# Patient Record
Sex: FEMALE | URBAN - METROPOLITAN AREA
[De-identification: names, ages, dates, MRNs, and addresses within clinical notes are randomized per-mention and may not be internally consistent; named-entity substitution may affect disease eponyms.]

---

## 2021-01-13 ENCOUNTER — OFFICE VISIT (OUTPATIENT)
Dept: ENDOCRINOLOGY | Age: 31
End: 2021-01-13
Payer: COMMERCIAL

## 2021-01-13 VITALS — HEIGHT: 65 IN | TEMPERATURE: 97.6 F | WEIGHT: 133.6 LBS | RESPIRATION RATE: 14 BRPM | BODY MASS INDEX: 22.26 KG/M2

## 2021-01-13 DIAGNOSIS — J45.20 MILD INTERMITTENT ASTHMA WITHOUT COMPLICATION: ICD-10-CM

## 2021-01-13 DIAGNOSIS — Z79.4 LONG-TERM INSULIN USE (HCC): ICD-10-CM

## 2021-01-13 PROCEDURE — 99243 OFF/OP CNSLTJ NEW/EST LOW 30: CPT | Performed by: INTERNAL MEDICINE

## 2021-01-13 RX ORDER — FEXOFENADINE HYDROCHLORIDE 60 MG/1
60 TABLET, FILM COATED ORAL DAILY
COMMUNITY

## 2021-01-13 RX ORDER — BLOOD-GLUCOSE METER
KIT MISCELLANEOUS
COMMUNITY
Start: 2020-11-19

## 2021-01-13 RX ORDER — GLUCAGON 3 MG/1
POWDER NASAL
Qty: 2 EACH | Refills: 3 | Status: SHIPPED | OUTPATIENT
Start: 2021-01-13 | End: 2021-05-05

## 2021-01-13 RX ORDER — DULAGLUTIDE 1.5 MG/.5ML
1.5 INJECTION, SOLUTION SUBCUTANEOUS WEEKLY
COMMUNITY
Start: 2020-07-21 | End: 2021-05-05

## 2021-01-13 RX ORDER — ALBUTEROL SULFATE 2.5 MG/3ML
2.5 SOLUTION RESPIRATORY (INHALATION) EVERY 6 HOURS PRN
COMMUNITY
Start: 2020-10-04

## 2021-01-13 RX ORDER — ALBUTEROL SULFATE 90 UG/1
1 AEROSOL, METERED RESPIRATORY (INHALATION) EVERY 4 HOURS PRN
COMMUNITY
Start: 2020-12-14

## 2021-01-13 RX ORDER — INSULIN GLARGINE 100 [IU]/ML
18 INJECTION, SOLUTION SUBCUTANEOUS NIGHTLY
COMMUNITY
End: 2021-05-11 | Stop reason: SDUPTHER

## 2021-01-13 RX ORDER — VALACYCLOVIR HYDROCHLORIDE 500 MG/1
500 TABLET, FILM COATED ORAL DAILY
COMMUNITY
Start: 2020-10-07

## 2021-01-13 NOTE — PROGRESS NOTES
Noah Trujillo is a 31 y.o. female is referred to me by Dr John Mejia for evaluation and management of uncontrolled type 1 diabetes. Noah Trujillo was diagnosed with Diabetes mellitus in 2015 . Diabetes was diagnosed at OGTT when she was pregnant with her son  . Noah Trujillo got diabetic education in the past.  Pt is noted to C pep < 1.0 in 2017. She has been on insulin for years  Comorbid conditions: Neuropathy  She stopped insulin after delivery but within 5 days she was admitted with DKA and sincethen she has been on insulin   She has Asthma and she is on inhalers     INTERIM:    Diabetes  She presents for her initial diabetic visit. She has type 1 diabetes mellitus. MedicAlert identification noted. The initial diagnosis of diabetes was made 5 years ago. Her disease course has been worsening. Hypoglycemia symptoms include sweats and tremors. Associated symptoms include foot paresthesias. There are no hypoglycemic complications. Symptoms are worsening. There are no diabetic complications. Current diabetic treatment includes insulin injections and oral agent (dual therapy). She is compliant with treatment most of the time. Her weight is stable. She is following a generally healthy and diabetic diet. Meal planning includes avoidance of concentrated sweets. She has had a previous visit with a dietitian. She participates in exercise daily. Her breakfast blood glucose is taken between 7-8 am. Her breakfast blood glucose range is generally 70-90 mg/dl. Her lunch blood glucose is taken between 11-12 pm. Her lunch blood glucose range is generally 140-180 mg/dl. Her dinner blood glucose is taken between 6-7 pm. Her dinner blood glucose range is generally 140-180 mg/dl. An ACE inhibitor/angiotensin II receptor blocker is not being taken. She sees a podiatrist.Eye exam is current.          She is on trulicity , lantus 18 units daily Metformin 500 TID   Having bruises due to trulicity and would like to stop it.  Occasionally has hypoglycemia with glucose as low as 50. Past Medical History:   Diagnosis Date    Asthma     [facility]     Eczema       Patient Active Problem List   Diagnosis    Type 1 diabetes, uncontrolled, with neuropathy ([facility]    Mild intermittent asthma without complication    Long-term insulin use ([facility]     History reviewed. No pertinent surgical history.   Social History     Socioeconomic History    Marital status: Unknown     Spouse name: Not on file    Number of children: Not on file    Years of education: Not on file    Highest education level: Not on file   Occupational History    Not on file   Social Needs    Financial resource strain: Not on file    Food insecurity     Worry: Not on file     Inability: Not on file    Transportation needs     Medical: Not on file     Non-medical: Not on file   Tobacco Use    Smoking status: Never Smoker    Smokeless tobacco: Never Used   Substance and Sexual Activity    Alcohol use: Yes     Comment: weekends    Drug use: Never    Sexual activity: Not on file   Lifestyle    Physical activity     Days per week: Not on file     Minutes per session: Not on file    Stress: Not on file   Relationships    Social connections     Talks on phone: Not on file     Gets together: Not on file     Attends Qu[employer] service: Not on file     Active member of club or organization: Not on file     Attends meetings of clubs or organizations: Not on file     Relationship status: Not on file    Intimate partner violence     Fear of current or ex partner: Not on file     Emotionally abused: Not on file     Physically abused: Not on file     Forced sexual activity: Not on file   Other Topics Concern    Not on file   Social History Narrative    Not on file     Family History   Problem Relation Age of Onset    Diabetes type 2  Maternal Uncle      Current Outpatient Medications   Medication Sig Dispense Refill    albuterol sulfate  (90 Base) MCG/ACT inhaler Inhale 1 puff into the lungs every 4 hours as needed      albuterol (PROVENTIL) (2.5 MG/3ML) 0.083% nebulizer solution Inhale 2.5 mg into the lungs every 6 hours as needed      Dulaglutide (TRULICITY) 1.5 UY/1.8UH SOPN Inject 1.5 mg into the skin once a week      fexofenadine (ALLEGRA) 60 MG tablet Take 60 mg by mouth daily      FREESTYLE LITE strip       insulin glargine (LANTUS) 100 UNIT/ML injection vial Inject 18 Units into the skin nightly      metFORMIN (GLUCOPHAGE) 500 MG tablet Take 1,000 mg by mouth 2 times daily (with meals)      valACYclovir (VALTREX) 500 MG tablet Take 500 mg by mouth daily      Glucagon (BAQSIMI ONE PACK) 3 MG/DOSE POWD To be used in case of severe hypoglycemia 2 each 3     No current facility-administered medications for this visit. Allergies   Allergen Reactions    Food Swelling     WALNUTS: tongue swells up     Family Status   Relation Name Status    [facility] [place] [facility]  (Not Specified)       Review of Systems  I have reviewed the review of system questionnaire filled by the patient .   Patient was advised to contact PCP for non endocrine signs and symptoms       OBJECTIVE:  Temp 97.6 °F (36.4 °C)   Resp 14   Ht 5' 5\" (1.651 m)   Wt 133 lb 9.6 oz (60.6 kg)   BMI 22.23 kg/m²    Wt Readings from Last 3 Encounters:   01/13/21 133 lb 9.6 oz (60.6 kg)       Constitutional: no acute distress, well appearing and well nourished  Psychiatric: oriented to person, place and time, judgement and insight and normal, recent and remote memory and intact and mood and affect are normal  Skin: skin and subcutaneous tissue is normal without mass, normal turgor  Head and Face: examination of head and face revealed no abnormalities  Eyes: no lid or conjunctival swelling, erythema or discharge, pupils are normal  Ears/Nose: external inspection of ears and nose revealed no abnormalities, hearing is grossly normal  Oropharynx/Mouth/Face: lips, tongue and gums are normal with no lesions, the voice quality was normal  Neck: neck is supple and symmetric, with midline trachea and no masses, thyroid is normal  Lymphatics: normal cervical lymph nodes, normal supraclavicular nodes  Pulmonary: no increased work of breathing or signs of respiratory distress, lungs are clear to auscultation  Cardiovascular: normal heart rate and rhythm, normal S1 and S2, no murmurs   Abdomen: abdomen is soft, non-tender with no masses  Musculoskeletal: normal gait and station . Neurological: normal coordination and normal general cortical function      No results found for: LABA1C      ASSESSMENT/PLAN:  1. Type 1 diabetes, uncontrolled, with neuropathy A1c 7.9 in January 2021     I had a lengthy discussion with the patient about  her  uncontrolled diabetes. We discussed progression of diabetes in detail and also the incidence of complications associated with uncontrolled diabetes. Tasia Kirkpatrickmings was advised that lifestyle modification is the key to better control of her Diabetes. We discussed carbohydrate restriction in detail. Patient is currently using 18 units of Lantus she was advised to reduce the dose to 16  Due to injection site issues she would like to stop Trulicity  She will continue with Metformin  Start CGMS and will add on meal boluses if there is wide fluctuations after meals  Patient is interested in insulin pump today we had a lengthy discussion about [employer] closed-loop system versus control IQ patient was given literature on both of them she would review it and will contact whichever one is covered with her insurance. Hypoglycemia protocol reviewed in detail with patient Patient was advised to carry glucose tablets and also have glucagon emergency kit. Patient checks her fingerstick blood glucose 4-6 times a day. Patient was advised that sending of her fingerstick blood glucose logs is crucial in management of her  diabetes.  I will adjust the  doses of diabetic medications according to sent data. Health Maintenance       Last Eye Exam: advised to have annual dilated eye exam. her last eye exam was in nov 2020   Last Podiatry Exam:  Last foot exam was in July 2020 with Dr Katie Sunshine   Lipids: Last LDL level was 57 in January 2021  Microalbumin/Creatinine Ratio: I have ordered MA with next lab work     . Education: Reviewed ABCs of diabetes management (respective goals in parentheses): A1C (<7), blood pressure (<130/80), and cholesterol (LDL <100). - C-Peptide; Future  - Comprehensive Metabolic Panel; Future  - TSH without Reflex; Future  - Anti-Thyroglobulin Antibody; Future  - Thyroid Peroxidase Antibody; Future  - T4, Free; Future  - Glutamic Acid Decarboxylase; Future  - Microalbumin / Creatinine Urine Ratio; Future  - Comprehensive Metabolic Panel; Future  - Hemoglobin A1C; Future  - Lipid Panel; Future  - Microalbumin / Creatinine Urine Ratio; Future  - TSH without Reflex; Future  - Vitamin D 25 Hydroxy; Future    --- Mild intermittent asthma without complication  She is on inhalers     Seasonal allergies  Stable      Reviewed and/or ordered clinical lab results yes   Reviewed and/or ordered radiology tests Yes  Reviewed and/or ordered other diagnostic tests yes   Made a decision to obtain old records yes   Reviewed and summarized old records yes     Cherie Estrada was counseled regarding symptoms of current diagnosis, course and complications of disease if inadequately treated, side effects of medications, diagnosis, treatment options, and prognosis, risks, benefits, complications, and alternatives of treatment, labs, imaging and other studies and treatment targets and goals. She understands instructions and counseling      Return in about 3 months (around 4/13/2021). Please note that some or all of this report was generated using voice recognition software.  Please notify me in case of any questions about the content of this document, as some errors in transcription may have occurred .

## 2021-01-19 ENCOUNTER — TELEPHONE (OUTPATIENT)
Dept: ENDOCRINOLOGY | Age: 31
End: 2021-01-19

## 2021-01-20 ENCOUNTER — TELEPHONE (OUTPATIENT)
Dept: ENDOCRINOLOGY | Age: 31
End: 2021-01-20

## 2021-01-20 ENCOUNTER — NURSE ONLY (OUTPATIENT)
Dept: ENDOCRINOLOGY | Age: 31
End: 2021-01-20
Payer: COMMERCIAL

## 2021-01-20 VITALS — TEMPERATURE: 97.3 F

## 2021-01-20 DIAGNOSIS — E06.3 HASHIMOTO'S DISEASE: ICD-10-CM

## 2021-01-20 PROCEDURE — 95251 CONT GLUC MNTR ANALYSIS I&R: CPT | Performed by: INTERNAL MEDICINE

## 2021-01-20 NOTE — PROGRESS NOTES
Patient presents for a Itaro removal. Sensor scanned and downloaded with no issues. Patient tolerated well and data sent to Dr. Ailyn Ornelas for review. Sensor removed. Skin remains intact. No complaints of pain or discomfort.

## 2021-01-20 NOTE — TELEPHONE ENCOUNTER
Diabetes Continuous Glucose Monitoring Report         Reason for Study:     - improve diabetic control without risk of hypoglycemia       Current Medication regimen:        CGMS Report   CGMS insertion date January 13, 2021  CGMS data collection was performed on January 20th 2021  Patient provided information on her  diet, activities and insulin dosing  during this period. Data was available for 6  days     Sensor Data Report:   - 12 AM to 6 AM: Overnight blood glucose pattern shows hypoglycemia  - 6   AM to 10 AM:  Post breakfast hyperglycemia is noted  --10AM to 5 PM : 1 episode of hypoglycemia observed during this time.   - 5   PM to 8 PM: Post meal hyperglycemia is noted       Average reading 119 mg/dL        40.5 coefficient of variation   % of time <70 mg/dL 17 %   % of time >180  mg/dL 10%   % of time within range 73%  Number of hypoglycemia episodes noted: 0     Impression:   CGMS shows overnight hypoglycemia with postprandial hypoglycemia     Recommendation:      Patient was advised to make the following changes in her diabetic regimen  Please advise patient to reduce long-acting insulin from 18 units to 16 units  I would recommend starting short acting insulin with meals that have high amounts of carbohydrate according to carbohydrate to insulin ratio of 10:1.

## 2021-01-21 ENCOUNTER — TELEPHONE (OUTPATIENT)
Dept: ENDOCRINOLOGY | Age: 31
End: 2021-01-21

## 2021-01-21 RX ORDER — INSULIN ASPART 100 [IU]/ML
INJECTION, SOLUTION INTRAVENOUS; SUBCUTANEOUS
Qty: 6 PEN | Refills: 3 | Status: SHIPPED | OUTPATIENT
Start: 2021-01-21 | End: 2021-05-11 | Stop reason: SDUPTHER

## 2021-01-21 RX ORDER — FLASH GLUCOSE SENSOR
KIT MISCELLANEOUS
Qty: 2 EACH | Refills: 5 | Status: SHIPPED | OUTPATIENT
Start: 2021-01-21 | End: 2021-05-05

## 2021-01-21 RX ORDER — FLASH GLUCOSE SCANNING READER
EACH MISCELLANEOUS
Qty: 1 DEVICE | Refills: 0 | Status: SHIPPED | OUTPATIENT
Start: 2021-01-21 | End: 2021-05-05

## 2021-01-21 NOTE — TELEPHONE ENCOUNTER
Fax from [facility] for a Prior authorization needed for the [facility] 14 day sensor qty 2 and also the [facility] 14 day reader qty 1    LOV:  1/20/21     FOV:  5/5/21

## 2021-01-21 NOTE — TELEPHONE ENCOUNTER
Patient aware. Novolog Rx  sent to pharmacy. Patient had labs done at [facility]. Geno Garcia. They are in [facility].

## 2021-01-21 NOTE — TELEPHONE ENCOUNTER
Please advise patient that her thyroid function test shows that she has positive thyroid antibodies showing that she does have Hashimoto's but her thyroid function test are still within normal limits so she will not be started on medication yet we will repeat the thyroid function test at follow-up

## 2021-01-22 ENCOUNTER — TELEPHONE (OUTPATIENT)
Dept: ENDOCRINOLOGY | Age: 31
End: 2021-01-22

## 2021-01-22 RX ORDER — BLOOD-GLUCOSE SENSOR
EACH MISCELLANEOUS
Qty: 3 EACH | Refills: 5 | Status: SHIPPED | OUTPATIENT
Start: 2021-01-22 | End: 2021-12-09 | Stop reason: SDUPTHER

## 2021-01-22 RX ORDER — BLOOD-GLUCOSE,RECEIVER,CONT
EACH MISCELLANEOUS
Qty: 1 DEVICE | Refills: 0 | Status: SHIPPED | OUTPATIENT
Start: 2021-01-22 | End: 2021-12-09 | Stop reason: SDUPTHER

## 2021-01-22 RX ORDER — BLOOD-GLUCOSE TRANSMITTER
EACH MISCELLANEOUS
Qty: 1 EACH | Refills: 3 | Status: SHIPPED | OUTPATIENT
Start: 2021-01-22 | End: 2021-12-09 | Stop reason: SDUPTHER

## 2021-01-22 NOTE — TELEPHONE ENCOUNTER
Submitted PA today 1/22/2021 and insurance states:    Message from [facility]: Drug is not covered by plan

## 2021-02-08 ENCOUNTER — TELEPHONE (OUTPATIENT)
Dept: ENDOCRINOLOGY | Age: 31
End: 2021-02-08

## 2021-02-11 ENCOUNTER — TELEPHONE (OUTPATIENT)
Dept: ENDOCRINOLOGY | Age: 31
End: 2021-02-11

## 2021-02-11 NOTE — TELEPHONE ENCOUNTER
Fax from [facility] supplies for a standard written order for Rcvr/trans dexcom G6 qty 1/4    LOV:  1/13/21    FOV:  5/5/21

## 2021-02-26 ENCOUNTER — TELEPHONE (OUTPATIENT)
Dept: ENDOCRINOLOGY | Age: 31
End: 2021-02-26

## 2021-02-26 NOTE — TELEPHONE ENCOUNTER
Fax from [facility] stating that they need to have a letter of medical necessity form sent to them

## 2021-03-08 ENCOUNTER — PATIENT MESSAGE (OUTPATIENT)
Dept: ENDOCRINOLOGY | Age: 31
End: 2021-03-08

## 2021-03-08 NOTE — TELEPHONE ENCOUNTER
From: David Guadalupe  To: Sarabjit Wong MD  Sent: 3/8/2021 11:28 AM EST  Subject: Prescription Question    Good morning  I was wondering if there was a way to get a new prescription for my pen needles I am running out and will not be able to make it until my appointment. I'm a new patient so this will be a new prescription from dr Jim Patiño.  Can you please [facility]'s [facility] because it's a [address] [facility]   Thanks

## 2021-05-05 ENCOUNTER — OFFICE VISIT (OUTPATIENT)
Dept: ENDOCRINOLOGY | Age: 31
End: 2021-05-05
Payer: COMMERCIAL

## 2021-05-05 VITALS
WEIGHT: 148 LBS | HEIGHT: 65 IN | RESPIRATION RATE: 16 BRPM | BODY MASS INDEX: 24.66 KG/M2 | SYSTOLIC BLOOD PRESSURE: 151 MMHG | DIASTOLIC BLOOD PRESSURE: 94 MMHG | HEART RATE: 80 BPM

## 2021-05-05 DIAGNOSIS — E03.8 HYPOTHYROIDISM DUE TO HASHIMOTO'S THYROIDITIS: ICD-10-CM

## 2021-05-05 DIAGNOSIS — E06.3 HYPOTHYROIDISM DUE TO HASHIMOTO'S THYROIDITIS: ICD-10-CM

## 2021-05-05 DIAGNOSIS — Z79.4 LONG-TERM INSULIN USE (HCC): ICD-10-CM

## 2021-05-05 DIAGNOSIS — J45.20 MILD INTERMITTENT ASTHMA WITHOUT COMPLICATION: ICD-10-CM

## 2021-05-05 PROCEDURE — 83036 HEMOGLOBIN GLYCOSYLATED A1C: CPT | Performed by: INTERNAL MEDICINE

## 2021-05-05 PROCEDURE — 95251 CONT GLUC MNTR ANALYSIS I&R: CPT | Performed by: INTERNAL MEDICINE

## 2021-05-05 PROCEDURE — 99214 OFFICE O/P EST MOD 30 MIN: CPT | Performed by: INTERNAL MEDICINE

## 2021-05-05 NOTE — PATIENT INSTRUCTIONS
GOALS - 1. HBA1C (3MONTH AVG) SHOULD BE LESS THAN 6.5     PLEASE CHECK YOUR SUGARS:   BEFORE BREAKFAST  BEFORE LUNCH  BEFORE DINNER  BEDTIME  AFTER MEALS    2. FINGERSTICKS SHOULD BE   BEFORE MEALS <   AFTER MEALS < 140   BEDTIME >100     3. CARBOHYDRATES  MEALS 40--60 G  SNACKS 15 - 20 G    4.  BLOOD PRESSURE  < 130/80    --- reduce lantus 16  units nightly   ---Start taking short acting insulin (humalog/novolog) according to carbohydrate to insulin ratio 5 gm of carb = 1 unit of short acting insulin,                                        +    Sliding Scale Insulin for short acting insulin   If BS > 120 -150 take 2 additional units of fast actinginsulin   if --180 take 3 units   181-210 take 4 Units  211-240 take 5Units   241--270 take 6 Units   271- 300 take  7 Units

## 2021-05-05 NOTE — PROGRESS NOTES
Sruthi Pastor is a 31 y.o. female is referred to me by Dr Georgette Albarran for evaluation and management of uncontrolled type 1 diabetes. Sruthi Pastor was diagnosed with Diabetes mellitus in 2015 . Diabetes was diagnosed at OGTT when she was pregnant with her son  . Sruthi Pastor got diabetic education in the past.  Pt is noted to C pep < 1.0 in 2017. She has been on insulin for years  Comorbid conditions: Neuropathy  She stopped insulin after delivery but within 5 days she was admitted with DKA and sincethen she has been on insulin   She has Asthma and she is on inhalers     INTERIM:    Diabetes  She presents for her follow-up diabetic visit. She has type 1 diabetes mellitus. MedicAlert identification noted. The initial diagnosis of diabetes was made 5 years ago. Her disease course has been worsening. Hypoglycemia symptoms include sweats and tremors. Associated symptoms include foot paresthesias. There are no hypoglycemic complications. Symptoms are worsening. There are no diabetic complications. Current diabetic treatment includes insulin injections and oral agent (dual therapy). She is compliant with treatment most of the time. Her weight is stable. She is following a generally healthy and diabetic diet. Meal planning includes avoidance of concentrated sweets. She has had a previous visit with a dietitian. She participates in exercise daily. Her breakfast blood glucose is taken between 7-8 am. Her breakfast blood glucose range is generally 70-90 mg/dl. Her lunch blood glucose is taken between 11-12 pm. Her lunch blood glucose range is generally 140-180 mg/dl. Her dinner blood glucose is taken between 6-7 pm. Her dinner blood glucose range is generally 140-180 mg/dl. An ACE inhibitor/angiotensin II receptor blocker is not being taken. She sees a podiatrist.Eye exam is current.         lantus 18 units daily Metformin 500 TID   She was Having bruises due to trulicity so stopped it in jan 2021 feels like she might have gained 5 pounds since stopping Trulicity  Denies any unexplained hypoglycemia but has occasional morning hypoglycemia and is currently eating snacks at bedtime to avoid that      Past Medical History:   Diagnosis Date    Asthma     Diabetes mellitus ([facility]     Eczema       Patient Active Problem List   Diagnosis    Type 1 diabetes, uncontrolled, with neuropathy ([facility]    Mild intermittent asthma without complication    Long-term insulin use ([facility]     History reviewed. No pertinent surgical history.   Social History     Socioeconomic History    Marital status: Unknown     Spouse name: Not on file    Number of children: Not on file    Years of education: Not on file    Highest education level: Not on file   Occupational History    Not on file   Social Needs    Financial resource strain: Not on file    Food insecurity     Worry: Not on file     Inability: Not on file    Transportation needs     Medical: Not on file     Non-medical: Not on file   Tobacco Use    Smoking status: Never Smoker    Smokeless tobacco: Never Used   Substance and Sexual Activity    Alcohol use: Yes     Comment: weekends    Drug use: Never    Sexual activity: Not on file   Lifestyle    Physical activity     Days per week: Not on file     Minutes per session: Not on file    Stress: Not on file   Relationships    Social connections     Talks on phone: Not on file     Gets together: Not on file     Attends Restorationist service: Not on file     Active member of club or organization: Not on file     Attends meetings of clubs or organizations: Not on file     Relationship status: Not on file    Intimate partner violence     Fear of current or ex partner: Not on file     Emotionally abused: Not on file     Physically abused: Not on file     Forced sexual activity: Not on file   Other Topics Concern    Not on file   Social History Narrative    Not on file     Family History   Problem Relation Age of Onset    Diabetes type 2 Maternal Uncle      Current Outpatient Medications   Medication Sig Dispense Refill    Insulin Pen Needle 31G X 8 MM MISC Inject insulin 4 times daily. 600 each 5    Continuous Blood Gluc  (DEXCOM G6 ) PRICILA Check glucose 1 Device 0    Continuous Blood Gluc Sensor (DEXCOM G6 SENSOR) MISC Change every 10 days 3 each 5    Continuous Blood Gluc Transmit (DEXCOM G6 TRANSMITTER) MISC To check glucose change one every 3 months 1 each 3    insulin aspart (NOVOLOG FLEXPEN) 100 UNIT/ML injection pen Inject 20 units into the skin with meals three times daily. 6 pen 3    albuterol sulfate  (90 Base) MCG/ACT inhaler Inhale 1 puff into the lungs every 4 hours as needed      albuterol (PROVENTIL) (2.5 MG/3ML) 0.083% nebulizer solution Inhale 2.5 mg into the lungs every 6 hours as needed      fexofenadine (ALLEGRA) 60 MG tablet Take 60 mg by mouth daily      FREESTYLE LITE strip       insulin glargine (LANTUS) 100 UNIT/ML injection vial Inject 18 Units into the skin nightly      metFORMIN (GLUCOPHAGE) 500 MG tablet Take 1,000 mg by mouth 2 times daily (with meals)      valACYclovir (VALTREX) 500 MG tablet Take 500 mg by mouth daily       No current facility-administered medications for this visit. Allergies   Allergen Reactions    Food Swelling     WALNUTS: tongue swells up     Family Status   Relation Name Status    MUnc  (Not Specified)       Review of Systems  I have reviewed the review of system questionnaire filled by the patient .   Patient was advised to contact PCP for non endocrine signs and symptoms       OBJECTIVE:  BP (!) 151/94   Pulse 80   Resp 16   Ht 5' 5\" (1.651 m)   Wt 148 lb (67.1 kg)   LMP 04/28/2021 (Approximate)   BMI 24.63 kg/m²    Wt Readings from Last 3 Encounters:   05/05/21 148 lb (67.1 kg)   01/13/21 133 lb 9.6 oz (60.6 kg)       Constitutional: no acute distress, well appearing and well nourished  Psychiatric: oriented to person, place and time, judgement and insight and normal, recent and remote memory and intact and mood and affect are normal  Skin: skin and subcutaneous tissue is normal without mass, normal turgor  Head and Face: examination of head and face revealed no abnormalities  Eyes: no lid or conjunctival swelling, erythema or discharge, pupils are normal  Ears/Nose: external inspection of ears and nose revealed no abnormalities, hearing is grossly normal  Oropharynx/Mouth/Face: lips, tongue and gums are normal with no lesions, the voice quality was normal  Neck: neck is supple and symmetric, with midline trachea and no masses, thyroid is normal  Lymphatics: normal cervical lymph nodes, normal supraclavicular nodes  Pulmonary: no increased work of breathing or signs of respiratory distress, lungs are clear to auscultation  Cardiovascular: normal heart rate and rhythm, normal S1 and S2, no murmurs   Musculoskeletal: normal gait and station . Neurological: normal coordination and normal general cortical function      No results found for: LABA1C      ASSESSMENT/PLAN:  1. Type 1 diabetes, uncontrolled, with neuropathy A1c 7.9 in January 2021>>6.4     I had a lengthy discussion with the patient about  her  uncontrolled diabetes. We discussed progression of diabetes in detail and also the incidence of complications associated with uncontrolled diabetes. Tanesha Coats was advised that lifestyle modification is the key to better control of her Diabetes. We discussed carbohydrate restriction in detail.   Patient is currently using 18 units of Lantus she was advised to reduce the dose to 16 units   Stopped trulicity due to injection site   She will continue with Metformin  She is using meal boluses as per 10:1, she will change that carb to insulin ratio to 15-1  She also uses SSI   She was provided with a sliding scale of insulin  Patient is interested in insulin pump today we had a lengthy discussion about Omnisio closed-loop system versus control IQ patient was given literature on both of them she would review it and will contact whichever one is covered with her insurance. Hypoglycemia protocol reviewed in detail with patient Patient was advised to carry glucose tablets and also have glucagon emergency kit. Patient checks her fingerstick blood glucose 4-6 times a day. Patient was advised that sending of her fingerstick blood glucose logs is crucial in management of her  diabetes. I will adjust the  doses of diabetic medications  according to sent data. Health Maintenance       Last Eye Exam: advised to have annual dilated eye exam. her last eye exam was in nov 2020   Last Podiatry Exam:  Last foot exam was in July 2020 with Dr Mara Westbrook   Lipids: Last LDL level was 57 in January 2021  Microalbumin/Creatinine Ratio: normal in jan 2021     . Education: Reviewed ABCs of diabetes management (respective goals in parentheses): A1C (<7), blood pressure (<130/80), and cholesterol (LDL <100).     HAshimotos thyroiditis   TPO positive 54   TSH 1.1  I discussed with her that eventually she probably would need to be on thyroid medication currently since the TSH is normal we will continue to monitor by serial thyroid function test she denies any extreme fatigue constipation or changes in skin or hair texture    --- Mild intermittent asthma without complication  She is on inhalers     Seasonal allergies  Stable      Reviewed and/or ordered clinical lab results yes   Reviewed and/or ordered radiology tests Yes  Reviewed and/or ordered other diagnostic tests yes   Made a decision to obtain old records yes   Reviewed and summarized old records yes     Faraz Crys was counseled regarding symptoms of current diagnosis, course and complications of disease if inadequately treated, side effects of medications, diagnosis, treatment options, and prognosis, risks, benefits, complications, and alternatives of treatment, labs, imaging and other studies and treatment targets and goals. She understands instructions and counseling      Return in about 3 months (around 8/5/2021). Please note that some or all of this report was generated using voice recognition software. Please notify me in case of any questions about the content of this document, as some errors in transcription may have occurred . Diabetes Continuous Glucose Monitoring Report         Reason for Study:     - improve diabetic control without risk of hypoglycemia       Current Medication regimen:   lantus 18 units    novolog as per SSI + CIR 10:1     CGMS Report     CGMS data collection was performed on may 5, 2021   Patient provided information on her  diet, activities and insulin dosing  during this period. Data was available for 14  days     Sensor Data Report:   - 12 AM to 6 AM: Overnight blood glucose pattern shows  - 6   AM to 10 AM:  Post breakfast  is noted  --10AM to 5 PM :  hypoglycemia observed during this time.   - 5   PM to 8 PM: Post meal  is noted       Average reading 147 mg/dL     Standard Dev  43  mg/dL   % of time <70 mg/dL  1%   % of time >180  mg/dL 24%   % of time within range 75 %  Number of hypoglycemia episodes noted: 0     Impression:   CGMS shows overnight hypoglycemia      Recommendation:      Patient was advised to make the following changes in her diabetic regimen  Reduce lantus from 18 units to 16 units   Avoid snack at bedtime

## 2021-05-06 LAB — HBA1C MFR BLD: 6.4 %

## 2021-05-11 ENCOUNTER — PATIENT MESSAGE (OUTPATIENT)
Dept: ENDOCRINOLOGY | Age: 31
End: 2021-05-11

## 2021-05-11 RX ORDER — INSULIN ASPART 100 [IU]/ML
INJECTION, SOLUTION INTRAVENOUS; SUBCUTANEOUS
Qty: 20 PEN | Refills: 1 | Status: SHIPPED | OUTPATIENT
Start: 2021-05-11 | End: 2022-07-12

## 2021-05-11 RX ORDER — INSULIN GLARGINE 100 [IU]/ML
18 INJECTION, SOLUTION SUBCUTANEOUS NIGHTLY
Qty: 2 VIAL | Refills: 1 | Status: SHIPPED | OUTPATIENT
Start: 2021-05-11 | End: 2021-05-12 | Stop reason: SDUPTHER

## 2021-05-11 NOTE — TELEPHONE ENCOUNTER
From: Bernarda Oppenheim  To: Anthony Bell MD  Sent: 5/11/2021 9:23 AM EDT  Subject: Prescription Question    Hi I was just there at my appointment and I forgot to ask for a new prescription for my Lantus if it could be sent to my [facility] pharmacy that is on file that would be awesome. If it could be for a 90 day supply so I can do their mail order pharmacy that would be helpful because it's cheaper.  Also [facility] a refill request for my novolog that is a 90 day supply so it can be mail order as well   Thanks so much

## 2021-05-12 RX ORDER — INSULIN GLARGINE 100 [IU]/ML
18 INJECTION, SOLUTION SUBCUTANEOUS NIGHTLY
Qty: 3 VIAL | Refills: 1 | Status: SHIPPED | OUTPATIENT
Start: 2021-05-12 | End: 2021-08-05 | Stop reason: SDUPTHER

## 2021-08-05 ENCOUNTER — VIRTUAL VISIT (OUTPATIENT)
Dept: ENDOCRINOLOGY | Age: 31
End: 2021-08-05
Payer: COMMERCIAL

## 2021-08-05 DIAGNOSIS — J45.20 MILD INTERMITTENT ASTHMA WITHOUT COMPLICATION: ICD-10-CM

## 2021-08-05 DIAGNOSIS — Z79.4 LONG-TERM INSULIN USE (HCC): ICD-10-CM

## 2021-08-05 PROCEDURE — 3051F HG A1C>EQUAL 7.0%<8.0%: CPT | Performed by: INTERNAL MEDICINE

## 2021-08-05 PROCEDURE — 99214 OFFICE O/P EST MOD 30 MIN: CPT | Performed by: INTERNAL MEDICINE

## 2021-08-05 PROCEDURE — 95251 CONT GLUC MNTR ANALYSIS I&R: CPT | Performed by: INTERNAL MEDICINE

## 2021-08-05 RX ORDER — INSULIN GLARGINE 100 [IU]/ML
18 INJECTION, SOLUTION SUBCUTANEOUS NIGHTLY
Qty: 3 VIAL | Refills: 1 | Status: SHIPPED | OUTPATIENT
Start: 2021-08-05 | End: 2021-09-13

## 2021-08-05 RX ORDER — GLUCAGON 3 MG/1
POWDER NASAL
Qty: 2 EACH | Refills: 3 | Status: SHIPPED | OUTPATIENT
Start: 2021-08-05

## 2021-08-06 ENCOUNTER — TELEPHONE (OUTPATIENT)
Dept: ENDOCRINOLOGY | Age: 31
End: 2021-08-06

## 2021-09-13 ENCOUNTER — PATIENT MESSAGE (OUTPATIENT)
Dept: ENDOCRINOLOGY | Age: 31
End: 2021-09-13

## 2021-09-13 RX ORDER — INSULIN GLARGINE 100 [IU]/ML
INJECTION, SOLUTION SUBCUTANEOUS
Qty: 10 PEN | Refills: 1 | Status: SHIPPED | OUTPATIENT
Start: 2021-09-13 | End: 2022-03-10 | Stop reason: SDUPTHER

## 2021-09-13 RX ORDER — CALCIUM CARB/VITAMIN D3/VIT K1 500-100-40
TABLET,CHEWABLE ORAL
Qty: 100 EACH | Refills: 3 | Status: SHIPPED | OUTPATIENT
Start: 2021-09-13 | End: 2022-08-25

## 2021-09-13 NOTE — TELEPHONE ENCOUNTER
From: Julianne Rivero  To: Estefany Hoyos MD  Sent: 9/13/2021 12:38 PM EDT  Subject: Prescription Question    Hi  sent over a prescription for Lantus vials instead of the flex pens I just need a prescription sent over for syringes for the lantus vials.  If you [facility] could [facility] day supply that would be great

## 2021-12-09 ENCOUNTER — OFFICE VISIT (OUTPATIENT)
Dept: ENDOCRINOLOGY | Age: 31
End: 2021-12-09
Payer: OTHER GOVERNMENT

## 2021-12-09 VITALS
SYSTOLIC BLOOD PRESSURE: 121 MMHG | HEART RATE: 68 BPM | WEIGHT: 157.2 LBS | BODY MASS INDEX: 26.16 KG/M2 | DIASTOLIC BLOOD PRESSURE: 78 MMHG

## 2021-12-09 DIAGNOSIS — J45.20 MILD INTERMITTENT ASTHMA WITHOUT COMPLICATION: Primary | ICD-10-CM

## 2021-12-09 PROCEDURE — 3051F HG A1C>EQUAL 7.0%<8.0%: CPT | Performed by: INTERNAL MEDICINE

## 2021-12-09 PROCEDURE — 99214 OFFICE O/P EST MOD 30 MIN: CPT | Performed by: INTERNAL MEDICINE

## 2021-12-09 PROCEDURE — 95251 CONT GLUC MNTR ANALYSIS I&R: CPT | Performed by: INTERNAL MEDICINE

## 2021-12-09 RX ORDER — BLOOD-GLUCOSE,RECEIVER,CONT
EACH MISCELLANEOUS
Qty: 1 EACH | Refills: 0 | Status: SHIPPED | OUTPATIENT
Start: 2021-12-09

## 2021-12-09 RX ORDER — BLOOD-GLUCOSE TRANSMITTER
EACH MISCELLANEOUS
Qty: 1 EACH | Refills: 3 | Status: SHIPPED | OUTPATIENT
Start: 2021-12-09

## 2021-12-09 RX ORDER — BLOOD-GLUCOSE SENSOR
EACH MISCELLANEOUS
Qty: 3 EACH | Refills: 5 | Status: SHIPPED | OUTPATIENT
Start: 2021-12-09

## 2021-12-09 NOTE — PROGRESS NOTES
August Chase is a 27 y.o. female is referred to me by Dr Rachna Guerrero for evaluation and management of uncontrolled type 1 diabetes. August Chase was diagnosed with Diabetes mellitus in 2015 . Diabetes was diagnosed at OGTT when she was pregnant with her son  . August Chase got diabetic education in the past.  Pt is noted to C pep < 1.0 in 2017. She has been on insulin for years  Comorbid conditions: Neuropathy  She stopped insulin after delivery but within 5 days she was admitted with DKA and sincethen she has been on insulin   She has Asthma and she is on inhalers     INTERIM:    Diabetes  She presents for her follow-up diabetic visit. She has type 1 diabetes mellitus. MedicAlert identification noted. The initial diagnosis of diabetes was made 5 years ago. Her disease course has been worsening. Hypoglycemia symptoms include sweats and tremors. Associated symptoms include foot paresthesias. There are no hypoglycemic complications. Symptoms are worsening. There are no diabetic complications. Current diabetic treatment includes insulin injections and oral agent (dual therapy). She is compliant with treatment most of the time. Her weight is stable. She is following a generally healthy and diabetic diet. Meal planning includes avoidance of concentrated sweets. She has had a previous visit with a dietitian. She participates in exercise daily. Her breakfast blood glucose is taken between 7-8 am. Her breakfast blood glucose range is generally 70-90 mg/dl. Her lunch blood glucose is taken between 11-12 pm. Her lunch blood glucose range is generally 140-180 mg/dl. Her dinner blood glucose is taken between 6-7 pm. Her dinner blood glucose range is generally 140-180 mg/dl. An ACE inhibitor/angiotensin II receptor blocker is not being taken. She sees a podiatrist.Eye exam is current.         lantus 18 units daily    Stopped trulicity in jan 1217 due to pain and bruising   Stop Metformin in July 2021  Denies any unexplained hypoglycemia but has occasional morning hypoglycemia and is currently eating snacks at bedtime to avoid that      Past Medical History:   Diagnosis Date    Asthma     Diabetes mellitus ([facility]     Eczema       Patient Active Problem List   Diagnosis    Type 1 diabetes, uncontrolled, with neuropathy ([facility]    Mild intermittent asthma without complication    Long-term insulin use ([facility]     History reviewed. No pertinent surgical history. Social History     Socioeconomic History    Marital status: Unknown     Spouse name: Not on file    Number of children: Not on file    Years of education: Not on file    Highest education level: Not on file   Occupational History    Not on file   Tobacco Use    Smoking status: Never Smoker    Smokeless tobacco: Never Used   Substance and Sexual Activity    Alcohol use: Yes     Comment: weekends    Drug use: Never    Sexual activity: Not on file   Other Topics Concern    Not on file   Social History Narrative    Not on file     Social Determinants of Health     Financial Resource Strain:     Difficulty of Paying Living Expenses: Not on file   Food Insecurity:     Worried About Running Out of Food in the Last Year: Not on file    Sweta of Food in the Last Year: Not on file   Transportation Needs:     Lack of Transportation (Medical): Not on file    Lack of Transportation (Non-Medical):  Not on file   Physical Activity:     Days of Exercise per Week: Not on file    Minutes of Exercise per Session: Not on file   Stress:     Feeling of Stress : Not on file   Social Connections:     Frequency of Communication with Friends and Family: Not on file    Frequency of Social Gatherings with Friends and Family: Not on file    Attends Denominational Services: Not on file    Active Member of Clubs or Organizations: Not on file    Attends Club or Organization Meetings: Not on file    Marital Status: Not on file   Intimate Partner Violence:     Fear of Current or tongue swells up     Family Status   Relation Name Status    MUnc  (Not Specified)   ROS   I have reviewed the review of system questionnaire filled by the patient . Patient was advised to contact PCP for non endocrine signs and symptoms     OBJECTIVE:  /78   Pulse 68   Wt 157 lb 3.2 oz (71.3 kg)   BMI 26.16 kg/m²    Wt Readings from Last 3 Encounters:   12/09/21 157 lb 3.2 oz (71.3 kg)   05/05/21 148 lb (67.1 kg)   01/13/21 133 lb 9.6 oz (60.6 kg)         Constitutional: no acute distress, well appearing and well nourished  Psychiatric: oriented to person, place and time, judgement and insight and normal, recent and remote memory intact and mood and affect are normal  Skin: skin and subcutaneous tissue is normal without visible mass,   Head and Face: visual inspection  of head and face revealed no abnormalities  Eyes: visual inspection showed no lid or conjunctival swelling, erythema or discharge, pupils are normal, equal, round  Ears/Nose: external inspection of ears and nose revealed no abnormalities, hearing is grossly normal  Oropharynx/Mouth/Face: lips, tongue and gums appear  normal with no lesions, the voice quality was normal  Neck: neck appears symmetric, with no visible masses,   Pulmonary: no increased work of breathing or signs of respiratory distress,  Musculoskeletal: normal on inspection    Neurological: normal coordination and normal general cortical function      Lab Results   Component Value Date    LABA1C 7.0 11/23/2021    LABA1C 7.2 07/30/2021    LABA1C 6.4 05/06/2021         ASSESSMENT/PLAN:    --- Type 1 diabetes, uncontrolled, with neuropathy A1c 7.9 in January 2021>>6.4>>7.2  C peptide <0.30 GODFREY negative. I had a lengthy discussion with the patient about  her  uncontrolled diabetes. We discussed progression of diabetes in detail and also the incidence of complications associated with uncontrolled diabetes.   Lanette Painter was advised that lifestyle modification is the key texture    --- Mild intermittent asthma without complication  She is on inhalers     Seasonal allergies  Stable      Reviewed and/or ordered clinical lab results yes   Reviewed and/or ordered radiology tests Yes  Reviewed and/or ordered other diagnostic tests yes   Made a decision to obtain old records yes   Reviewed and summarized old records yes     Ora Yuly was counseled regarding symptoms of current diagnosis, course and complications of disease if inadequately treated, side effects of medications, diagnosis, treatment options, and prognosis, risks, benefits, complications, and alternatives of treatment, labs, imaging and other studies and treatment targets and goals. She understands instructions and counseling      No follow-ups on file. Please note that some or all of this report was generated using voice recognition software. Please notify me in case of any questions about the content of this document, as some errors in transcription may have occurred . Diabetes Continuous Glucose Monitoring Report         Reason for Study:     - improve diabetic control without risk of hypoglycemia       Current Medication regimen:   lantus 18 units    novolog as per SSI + CIR 15 :1     CGMS Report     CGMS data collection was performed on Dec 9 , 2021   Patient provided information on her  diet, activities and insulin dosing  during this period. Data was available for 14  days     Sensor Data Report:   - 12 AM to 6 AM: Overnight blood glucose pattern shows  - 6   AM to 10 AM:  Post breakfast  is noted  --10AM to 5 PM :  hypoglycemia observed during this time.   - 5   PM to 8 PM: Post meal hyperglycemia   is noted       Average reading 142  mg/dL     Standard Dev  74  mg/dL   % of time <70 mg/dL  24%   % of time >180  mg/dL 27 %   % of time within range 49 %  Number of hypoglycemia episodes noted: 0     Impression:   CGMS shows overnight hypoglycemia      Recommendation:      Patient was advised to make the following changes in her diabetic regimen  Reduce lantus from 18 units to 16 units   Reduce CIR with dinner to 10:1

## 2021-12-14 ENCOUNTER — TELEPHONE (OUTPATIENT)
Dept: ENDOCRINOLOGY | Age: 31
End: 2021-12-14

## 2022-02-07 ENCOUNTER — TELEPHONE (OUTPATIENT)
Dept: ENDOCRINOLOGY | Age: 32
End: 2022-02-07

## 2022-03-09 ENCOUNTER — PATIENT MESSAGE (OUTPATIENT)
Dept: ENDOCRINOLOGY | Age: 32
End: 2022-03-09

## 2022-03-10 RX ORDER — INSULIN GLARGINE 100 [IU]/ML
INJECTION, SOLUTION SUBCUTANEOUS
Qty: 10 PEN | Refills: 0 | Status: SHIPPED | OUTPATIENT
Start: 2022-03-10

## 2022-03-10 NOTE — TELEPHONE ENCOUNTER
From: Aylin Nguyễn  To: Dr. Lanell Phalen  Sent: 3/9/2022 10:19 PM EST  Subject: Prescription refill     Hi I have a upcoming appointment in April but I am going to run out of lantus by then can you [facility]  Thanks so much

## 2022-03-21 ENCOUNTER — TELEPHONE (OUTPATIENT)
Dept: ENDOCRINOLOGY | Age: 32
End: 2022-03-21

## 2022-03-21 NOTE — TELEPHONE ENCOUNTER
Fax from [place] w/ [address] [facility],[address] forms to complete & Letter of Attestation

## 2022-03-29 NOTE — TELEPHONE ENCOUNTER
Fax back from [facility]     -some codes are exempt and do not require an auth, please submit CPT/HCPCS requested for further review

## 2022-03-29 NOTE — TELEPHONE ENCOUNTER
Faxed back that there is no CPT code for the Dexcom sensors or transmitters. This acts as a medication.

## 2022-04-07 LAB
A/G RATIO: 1.9 (ref 1–2)
ALBUMIN SERPL-MCNC: 4.5 G/DL (ref 3.5–5.7)
ALBUMIN/PROTEIN TOTAL, SER/PL: 6.9 G/DL (ref 6–8.3)
ALP BLD-CCNC: 42 U/L (ref 34–104)
ALT SERPL-CCNC: 12 U/L (ref 7–52)
ANION GAP 4: 8 MMOL/L (ref 7–16)
AST W/O P-5'-P: 13 U/L (ref 13–39)
BILIRUB SERPL-MCNC: 0.5 MG/DL (ref 0.3–1)
BUN BLDV-MCNC: 21 MG/DL (ref 7–25)
CALCIUM SERPL-MCNC: 9.5 MG/DL (ref 8.6–10.2)
CHLORIDE BLD-SCNC: 102 MMOL/L (ref 98–107)
CHOLESTEROL, STONE: 184 MG/DL
CO2: 26 MMOL/L (ref 21–31)
CREATININE + EGFR PANEL: 0.8 MG/DL (ref 0.6–1.2)
CREATININE URINE: 66.05 MG/DL
DEPRECATED MEAN BLOOD GLUCOSE: 166 MG/DL (ref 68–126)
GFR CALCULATED: > 60 ML/MIN/1.73M2
GFR CALCULATED: > 60 ML/MIN/1.73M2
GLOBULIN: 2.4 G/DL (ref 2.6–4.2)
GLUCOSE: 107 MG/DL (ref 74–109)
HBA1C MFR BLD: 7.4 % (ref 4–6)
HDLC SERPL-MCNC: 97 MG/DL (ref 40–60)
LDL CHOLESTEROL CALCULATED: 79 MG/DL (ref 0–99)
MICROALBUMIN UR-MCNC: < 7 MG/L
MICROALBUMIN/CREAT UR-RTO: NORMAL
POTASSIUM SERPL-SCNC: 3.8 MMOL/L (ref 3.5–5.3)
SODIUM BLD-SCNC: 136 MMOL/L (ref 136–145)
TRIGL SERPL-MCNC: 41 MG/DL
TSH ULTRASENSITIVE: 1.21 UIU/ML (ref 0.45–5.33)
VLDLC SERPL CALC-MCNC: 8 MG/DL (ref 0–40)

## 2022-04-21 ENCOUNTER — OFFICE VISIT (OUTPATIENT)
Dept: ENDOCRINOLOGY | Age: 32
End: 2022-04-21
Payer: OTHER GOVERNMENT

## 2022-04-21 VITALS
SYSTOLIC BLOOD PRESSURE: 134 MMHG | HEART RATE: 65 BPM | DIASTOLIC BLOOD PRESSURE: 85 MMHG | BODY MASS INDEX: 27.16 KG/M2 | HEIGHT: 65 IN | RESPIRATION RATE: 16 BRPM | WEIGHT: 163 LBS

## 2022-04-21 DIAGNOSIS — J45.20 MILD INTERMITTENT ASTHMA WITHOUT COMPLICATION: ICD-10-CM

## 2022-04-21 DIAGNOSIS — Z79.4 LONG-TERM INSULIN USE (HCC): ICD-10-CM

## 2022-04-21 PROCEDURE — 3051F HG A1C>EQUAL 7.0%<8.0%: CPT | Performed by: INTERNAL MEDICINE

## 2022-04-21 PROCEDURE — 95251 CONT GLUC MNTR ANALYSIS I&R: CPT | Performed by: INTERNAL MEDICINE

## 2022-04-21 PROCEDURE — 99214 OFFICE O/P EST MOD 30 MIN: CPT | Performed by: INTERNAL MEDICINE

## 2022-04-21 RX ORDER — FLUTICASONE PROPIONATE AND SALMETEROL 250; 50 UG/1; UG/1
POWDER RESPIRATORY (INHALATION)
COMMUNITY
Start: 2021-08-30

## 2022-04-21 NOTE — PROGRESS NOTES
Stephanie Cruz is a 27 y.o. female is referred to me by Dr Milagros Ramirez for evaluation and management of uncontrolled type 1 diabetes. Stephaine Cruz was diagnosed with Diabetes mellitus in 2015 . Diabetes was diagnosed at OGTT when she was pregnant with her son  . Stephanie Cruz got diabetic education in the past.  Pt is noted to C pep < 1.0 in 2017. She has been on insulin for years  Comorbid conditions: Neuropathy  She stopped insulin after delivery but within 5 days she was admitted with DKA and sincethen she has been on insulin   She has Asthma and she is on inhalers. INTERIM:    Diabetes  She presents for her follow-up diabetic visit. She has type 1 diabetes mellitus. MedicAlert identification noted. The initial diagnosis of diabetes was made 5 years ago. Her disease course has been worsening. Hypoglycemia symptoms include sweats and tremors. Associated symptoms include foot paresthesias. There are no hypoglycemic complications. Symptoms are worsening. There are no diabetic complications. Current diabetic treatment includes insulin injections and oral agent (dual therapy). She is compliant with treatment most of the time. Her weight is stable. She is following a generally healthy and diabetic diet. Meal planning includes avoidance of concentrated sweets. She has had a previous visit with a dietitian. She participates in exercise daily. Her breakfast blood glucose is taken between 7-8 am. Her breakfast blood glucose range is generally 70-90 mg/dl. Her lunch blood glucose is taken between 11-12 pm. Her lunch blood glucose range is generally 140-180 mg/dl. Her dinner blood glucose is taken between 6-7 pm. Her dinner blood glucose range is generally 140-180 mg/dl. An ACE inhibitor/angiotensin II receptor blocker is not being taken. She sees a podiatrist.Eye exam is current.         lantus 18 units daily    Stopped trulicity in jan 3902 due to pain and bruising but is wondering if she needs to start again as she has gained weight almost 30 pounds in the last 1 year  Stop Metformin in July 2021  Denies any unexplained hypoglycemia but has occasional morning hypoglycemia and is currently eating snacks at bedtime to avoid that      Past Medical History:   Diagnosis Date    Asthma     Diabetes mellitus ([facility]     Eczema       Patient Active Problem List   Diagnosis    Type 1 diabetes, uncontrolled, with neuropathy ([facility]    Mild intermittent asthma without complication    Long-term insulin use ([facility]     History reviewed. No pertinent surgical history. Social History     Socioeconomic History    Marital status: Unknown     Spouse name: Not on file    Number of children: Not on file    Years of education: Not on file    Highest education level: Not on file   Occupational History    Not on file   Tobacco Use    Smoking status: Never Smoker    Smokeless tobacco: Never Used   Substance and Sexual Activity    Alcohol use: Yes     Comment: weekends    Drug use: Never    Sexual activity: Not on file   Other Topics Concern    Not on file   Social History Narrative    Not on file     Social Determinants of Health     Financial Resource Strain:     Difficulty of Paying Living Expenses: Not on file   Food Insecurity:     Worried About Running Out of Food in the Last Year: Not on file    Sweta of Food in the Last Year: Not on file   Transportation Needs:     Lack of Transportation (Medical): Not on file    Lack of Transportation (Non-Medical):  Not on file   Physical Activity:     Days of Exercise per Week: Not on file    Minutes of Exercise per Session: Not on file   Stress:     Feeling of Stress : Not on file   Social Connections:     Frequency of Communication with Friends and Family: Not on file    Frequency of Social Gatherings with Friends and Family: Not on file    Attends Muslim Services: Not on file    Active Member of Clubs or Organizations: Not on file    Attends Club or Organization Meetings: Not on file    Marital Status: Not on file   Intimate Partner Violence:     Fear of Current or Ex-Partner: Not on file    Emotionally Abused: Not on file    Physically Abused: Not on file    Sexually Abused: Not on file   Housing Stability:     Unable to Pay for Housing in the Last Year: Not on file    Number of [place] in the Last Year: Not on file    Unstable Housing in the Last Year: Not on file     Family History   Problem Relation Age of Onset    Diabetes type 2  Maternal Uncle      Current Outpatient Medications   Medication Sig Dispense Refill    fluticasone-salmeterol (ADVAIR DISKUS) 250-50 MCG/ACT AEPB diskus inhaler       Semaglutide, 1 MG/DOSE, 2 MG/1.5ML SOPN Take 0.25 mg weekly 6 mL 5    insulin glargine (LANTUS SOLOSTAR) 100 UNIT/ML injection pen Inject 18 units into the skin daily. 10 pen 0    Continuous Blood Gluc  (DEXCOM G6 ) PRICILA Check glucose 1 each 0    Continuous Blood Gluc Sensor (DEXCOM G6 SENSOR) MISC Change every 10 days 3 each 5    Continuous Blood Gluc Transmit (DEXCOM G6 TRANSMITTER) MISC To check glucose change one every 3 months 1 each 3    Insulin Syringe-Needle U-100 (INSULIN SYRINGE 1CC/31GX5/16\") 31G X 5/16\" 1 ML MISC Use to inject insulin daily. 100 each 3    metFORMIN (GLUCOPHAGE) 500 MG tablet Take 2 tablets by mouth 2 times daily (with meals) 360 tablet 1    Insulin Pen Needle 31G X 8 MM MISC Inject insulin 4 times daily. 600 each 5    Glucagon (BAQSIMI ONE PACK) 3 MG/DOSE POWD To be used in case of severe hypoglycemia 2 each 3    insulin aspart (NOVOLOG FLEXPEN) 100 UNIT/ML injection pen Inject 20 units into the skin with meals three times daily.  20 pen 1    albuterol sulfate  (90 Base) MCG/ACT inhaler Inhale 1 puff into the lungs every 4 hours as needed      albuterol (PROVENTIL) (2.5 MG/3ML) 0.083% nebulizer solution Inhale 2.5 mg into the lungs every 6 hours as needed      fexofenadine (ALLEGRA) 60 MG tablet Take 60 mg by mouth daily      FREESTYLE LITE strip       valACYclovir (VALTREX) 500 MG tablet Take 500 mg by mouth daily       No current facility-administered medications for this visit. Allergies   Allergen Reactions    Food Swelling     WALNUTS: tongue swells up     Family Status   Relation Name Status    MUnc  (Not Specified)   ROS   I have reviewed the review of system questionnaire filled by the patient .   Patient was advised to contact PCP for non endocrine signs and symptoms     OBJECTIVE:  /85   Pulse 65   Resp 16   Ht 5' 5\" (1.651 m)   Wt 163 lb (73.9 kg)   LMP 04/21/2022 (Exact Date)   BMI 27.12 kg/m²    Wt Readings from Last 3 Encounters:   04/21/22 163 lb (73.9 kg)   12/09/21 157 lb 3.2 oz (71.3 kg)   05/05/21 148 lb (67.1 kg)     Constitutional: no acute distress, well appearing and well nourished  Psychiatric: oriented to person, place and time, judgement and insight and normal, recent and remote memory intact and mood and affect are normal  Skin: skin and subcutaneous tissue is normal without visible mass,   Head and Face: visual inspection  of head and face revealed no abnormalities  Eyes: visual inspection showed no lid or conjunctival swelling, erythema or discharge, pupils are normal, equal, round  Ears/Nose: external inspection of ears and nose revealed no abnormalities, hearing is grossly normal  Oropharynx/Mouth/Face: lips, tongue and gums appear  normal with no lesions, the voice quality was normal  Neck: neck appears symmetric, with no visible masses,   Pulmonary: no increased work of breathing or signs of respiratory distress,  Musculoskeletal: normal on inspection    Neurological: normal coordination and normal general cortical function      Lab Results   Component Value Date    LABA1C 7.4 04/07/2022    LABA1C 7.0 11/23/2021    LABA1C 7.2 07/30/2021         ASSESSMENT/PLAN:    --- Type 1 diabetes, uncontrolled, with neuropathy A1c 7.9 in January 2021>>6.4>>7.2  C peptide <0.30 GODFREY negative. I had a lengthy discussion with the patient about  her  uncontrolled diabetes. We discussed progression of diabetes in detail and also the incidence of complications associated with uncontrolled diabetes. Willis Swift was advised that lifestyle modification is the key to better control of her Diabetes. We discussed carbohydrate restriction in detail. Patient is currently using 18 --23 units of Lantus at night   Taking 18 units of Lantus in the morning  Switch to omnipod she will start the paperwork  Retry ozempic, prescription sent  She is using meal boluses as per 10:1, stressed that she needs to take her meal boluses 10 minutes prior to eating which she currently is taking sometimes an hour later  She also uses SSI   Stopped trulicity due to injection site issues   Stop Metformin    Patient is interested in insulin pump today we had a lengthy discussion about [employer] closed-loop system versus control IQ patient was given literature on both of them she would review it and will contact whichever one is covered with her insurance. Hypoglycemia protocol reviewed in detail with patient Patient was advised to carry glucose tablets and also have glucagon emergency kit. Patient checks her fingerstick blood glucose 4-6 times a day. Patient was advised that sending of her fingerstick blood glucose logs is crucial in management of her  diabetes. I will adjust the  doses of diabetic medications  according to sent data. Health Maintenance       Last Eye Exam: advised to have annual dilated eye exam. her last eye exam was in nov 2020   Last Podiatry Exam:  Last foot exam was in July 2020 with Dr Sarai Villalpando   Lipids: Last LDL level was 58  in July 2021  Microalbumin/Creatinine Ratio: normal in jan 2021     . Education: Reviewed ABCs of diabetes management (respective goals in parentheses): A1C (<7), blood pressure (<130/80), and cholesterol (LDL <100).     HAshimotos thyroiditis TPO positive 54   TSH 1.1  I discussed with her that eventually she probably would need to be on thyroid medication currently since the TSH is normal we will continue to monitor by serial thyroid function test she denies any extreme fatigue constipation or changes in skin or hair texture    --- Mild intermittent asthma without complication  She is on inhalers     Seasonal allergies  Stable      Reviewed and/or ordered clinical lab results yes   Reviewed and/or ordered radiology tests Yes  Reviewed and/or ordered other diagnostic tests yes   Made a decision to obtain old records yes   Reviewed and summarized old records yes     Radha Raymond was counseled regarding symptoms of current diagnosis, course and complications of disease if inadequately treated, side effects of medications, diagnosis, treatment options, and prognosis, risks, benefits, complications, and alternatives of treatment, labs, imaging and other studies and treatment targets and goals. She understands instructions and counseling      Return in about 3 months (around 7/21/2022). Please note that some or all of this report was generated using voice recognition software. Please notify me in case of any questions about the content of this document, as some errors in transcription may have occurred . Diabetes Continuous Glucose Monitoring Report         Reason for Study:     - improve diabetic control without risk of hypoglycemia       Current Medication regimen:   lantus 18 units    novolog as per SSI + CIR 15 :1     CGMS Report     CGMS data collection was performed on April 21, 2022  Patient provided information on her  diet, activities and insulin dosing  during this period. Data was available for 14  days     Sensor Data Report:   - 12 AM to 6 AM: Overnight blood glucose pattern shows  - 6   AM to 10 AM:  Post breakfast  is noted  --10AM to 5 PM :  hypoglycemia observed during this time.   - 5   PM to 8 PM: Post meal hyperglycemia is noted       Average [id number] mg/dL     Standard Dev 54 mg/dL   % of time <70 mg/dL 1 %   % of time >180  Mg/dL34 %   % of time within range 65 %  Number of hypoglycemia episodes noted: 0     Impression:   CGMS shows overnight hypoglycemia      Recommendation:      Patient was advised to make the following changes in her diabetic regimen  Reduce lantus from 18 unitsj  Start Ozempic  Switch to insulin pump

## 2022-04-25 RX ORDER — SEMAGLUTIDE 1.34 MG/ML
INJECTION, SOLUTION SUBCUTANEOUS
Qty: 4.5 ML | Refills: 1 | Status: SHIPPED | OUTPATIENT
Start: 2022-04-25 | End: 2022-08-25

## 2022-04-25 NOTE — TELEPHONE ENCOUNTER
Fax from [facility]    Clarification request for Semaglutide rx, It is avail as GPGXIR & Ozempic but both 1mg pens deliver a fixed dose of 1mg not . 25

## 2022-04-26 ENCOUNTER — TELEPHONE (OUTPATIENT)
Dept: ENDOCRINOLOGY | Age: 32
End: 2022-04-26

## 2022-05-25 ENCOUNTER — TELEPHONE (OUTPATIENT)
Dept: ENDOCRINOLOGY | Age: 32
End: 2022-05-25

## 2022-05-26 RX ORDER — INSULIN PUMP CONTROLLER
EACH MISCELLANEOUS
Qty: 10 EACH | Refills: 3 | Status: SHIPPED | OUTPATIENT
Start: 2022-05-26 | End: 2022-06-01 | Stop reason: SDUPTHER

## 2022-05-31 ENCOUNTER — PATIENT MESSAGE (OUTPATIENT)
Dept: ENDOCRINOLOGY | Age: 32
End: 2022-05-31

## 2022-05-31 NOTE — TELEPHONE ENCOUNTER
From: Mai Stanford  Sent: 5/31/2022 4:07 PM EDT  To: [facility] Clinical Staff  Subject: Omnipod prior authorization     90 days might be better with my insurance since they are always giving me trouble

## 2022-05-31 NOTE — TELEPHONE ENCOUNTER
Submitted PA for Omnipod DASH Pods (Gen 4)    Key: [id number]    Via CMM STATUS: Approved 5/1-22-5/31/23

## 2022-06-01 RX ORDER — INSULIN PUMP CONTROLLER
EACH MISCELLANEOUS
Qty: 30 EACH | Refills: 3 | Status: SHIPPED | OUTPATIENT
Start: 2022-06-01

## 2022-06-06 ENCOUNTER — TELEPHONE (OUTPATIENT)
Dept: ENDOCRINOLOGY | Age: 32
End: 2022-06-06

## 2022-06-06 NOTE — TELEPHONE ENCOUNTER
Omni pod problems. She is going to be with out her pump for a few days. She wants to know if she should back on lantus until the pump comes in the mail. She has lantus on hand. Her pods will arrive Wednesday or Thursday. She took 4 units of novalog around 12 noon with a meal and her  before novalog 130 after novalog. Please call with nurse advice.      Ph.

## 2022-06-07 NOTE — TELEPHONE ENCOUNTER
Yes please advise to go back on the previous dose of Lantus along with the NovoLog hopefully she will get her OmniPod

## 2022-07-12 RX ORDER — INSULIN ASPART 100 [IU]/ML
INJECTION, SOLUTION INTRAVENOUS; SUBCUTANEOUS
Qty: 60 ML | Refills: 0 | Status: SHIPPED | OUTPATIENT
Start: 2022-07-12 | End: 2022-08-25 | Stop reason: SDUPTHER

## 2022-07-12 NOTE — TELEPHONE ENCOUNTER
Fax from [facility]-we rec'd allergy info \"food\" and we are unsure of the intent    rx-Novolog Flexpen

## 2022-08-18 ENCOUNTER — TELEPHONE (OUTPATIENT)
Dept: ENDOCRINOLOGY | Age: 32
End: 2022-08-18

## 2022-08-18 ENCOUNTER — PATIENT MESSAGE (OUTPATIENT)
Dept: ENDOCRINOLOGY | Age: 32
End: 2022-08-18

## 2022-08-18 NOTE — TELEPHONE ENCOUNTER
You should still do your bloodwork. Stay safe and healthy! Jess Gilomre LPN  [facility] Endocrinology  O: [phone]  F: [phone]  [email or web address]

## 2022-08-18 NOTE — TELEPHONE ENCOUNTER
Please advise patient that she can make changes in her insulin pump setting if she knows how to I would recommend increasing her basal rate by 10 to 20% of what she is currently getting and she can also increase her corrective boluses by taking manual boluses via the pump.

## 2022-08-18 NOTE — TELEPHONE ENCOUNTER
M-Changa message sent from patient:    Good morning  Im currently fighting off some kind of infection and my sugars will not stay down Im taking fast acting insulin an using my pump but my sugars are still staying high. My morning sugars have been in the 200s. Should I change my settings on my pump so I get more basal units throughout the day ?   Thanks  Yvrose boyd    I just wanted to add that I took 8 units of fast acting insulin with my novolog pen to try an help gets my numbers down quicker on top of my Bolus from my pump but still had high numbers most of the day   Thanks     -Dexcom and Omnipod 8/5/22-8/18/22

## 2022-08-19 LAB
A/G RATIO: 1.6 (ref 1–2)
ALBUMIN SERPL-MCNC: 4.1 G/DL (ref 3.5–5.7)
ALBUMIN/PROTEIN TOTAL, SER/PL: 6.7 G/DL (ref 6–8.3)
ALP BLD-CCNC: 39 U/L (ref 34–104)
ALT SERPL-CCNC: 12 U/L (ref 7–52)
ANION GAP 4: 9 MMOL/L (ref 7–16)
AST W/O P-5'-P: 16 U/L (ref 13–39)
BILIRUB SERPL-MCNC: 0.5 MG/DL (ref 0.3–1)
BUN BLDV-MCNC: 12 MG/DL (ref 7–25)
CALCIUM SERPL-MCNC: 8.7 MG/DL (ref 8.6–10.2)
CHLORIDE BLD-SCNC: 104 MMOL/L (ref 98–107)
CHOLESTEROL, STONE: 174 MG/DL
CO2: 25 MMOL/L (ref 21–31)
CREATININE + EGFR PANEL: 0.8 MG/DL (ref 0.6–1.2)
CREATININE URINE: 161.4 MG/DL
DEPRECATED MEAN BLOOD GLUCOSE: 148 MG/DL (ref 68–126)
GFR CALCULATED: > 60 ML/MIN/1.73M2
GFR CALCULATED: > 60 ML/MIN/1.73M2
GLOBULIN: 2.6 G/DL (ref 2.6–4.2)
GLUCOSE: 138 MG/DL (ref 74–109)
HBA1C MFR BLD: 6.8 % (ref 4–6)
HDLC SERPL-MCNC: 88 MG/DL (ref 40–60)
LDL CHOLESTEROL CALCULATED: 74 MG/DL (ref 0–99)
MICROALBUMIN UR-MCNC: 9.3 MG/L
MICROALBUMIN/CREAT UR-RTO: 5.8 MG ALB/G CREAT
POTASSIUM SERPL-SCNC: 3.9 MMOL/L (ref 3.5–5.3)
SODIUM BLD-SCNC: 138 MMOL/L (ref 136–145)
TRIGL SERPL-MCNC: 58 MG/DL
TSH ULTRASENSITIVE: 0.91 UIU/ML (ref 0.45–5.33)
VLDLC SERPL CALC-MCNC: 12 MG/DL (ref 0–40)

## 2022-08-25 ENCOUNTER — OFFICE VISIT (OUTPATIENT)
Dept: ENDOCRINOLOGY | Age: 32
End: 2022-08-25
Payer: OTHER GOVERNMENT

## 2022-08-25 VITALS
WEIGHT: 166 LBS | SYSTOLIC BLOOD PRESSURE: 129 MMHG | DIASTOLIC BLOOD PRESSURE: 88 MMHG | HEART RATE: 66 BPM | RESPIRATION RATE: 16 BRPM | BODY MASS INDEX: 27.66 KG/M2 | HEIGHT: 65 IN

## 2022-08-25 DIAGNOSIS — E03.8 HYPOTHYROIDISM DUE TO HASHIMOTO'S THYROIDITIS: ICD-10-CM

## 2022-08-25 DIAGNOSIS — E06.3 HYPOTHYROIDISM DUE TO HASHIMOTO'S THYROIDITIS: ICD-10-CM

## 2022-08-25 DIAGNOSIS — J45.20 MILD INTERMITTENT ASTHMA WITHOUT COMPLICATION: ICD-10-CM

## 2022-08-25 PROCEDURE — 95251 CONT GLUC MNTR ANALYSIS I&R: CPT | Performed by: INTERNAL MEDICINE

## 2022-08-25 PROCEDURE — 99214 OFFICE O/P EST MOD 30 MIN: CPT | Performed by: INTERNAL MEDICINE

## 2022-08-25 PROCEDURE — 3044F HG A1C LEVEL LT 7.0%: CPT | Performed by: INTERNAL MEDICINE

## 2022-08-25 RX ORDER — INSULIN PMP CART,AUT,G6/7,CNTR
EACH SUBCUTANEOUS
Qty: 30 EACH | Refills: 3 | Status: SHIPPED | OUTPATIENT
Start: 2022-08-25

## 2022-08-25 RX ORDER — INSULIN PMP CART,AUT,G6/7,CNTR
EACH SUBCUTANEOUS
Qty: 1 KIT | Refills: 0 | Status: SHIPPED | OUTPATIENT
Start: 2022-08-25

## 2022-08-25 RX ORDER — INSULIN ASPART 100 [IU]/ML
INJECTION, SOLUTION INTRAVENOUS; SUBCUTANEOUS
Qty: 60 ML | Refills: 3 | Status: SHIPPED | OUTPATIENT
Start: 2022-08-25

## 2022-08-25 RX ORDER — INSULIN PMP CART,AUT,G6/7,CNTR
EACH SUBCUTANEOUS
Qty: 30 KIT | Refills: 3 | Status: SHIPPED | OUTPATIENT
Start: 2022-08-25 | End: 2022-08-25

## 2022-08-25 NOTE — PROGRESS NOTES
Riddhi Snowden is a 27 y.o. female is referred to me by Dr Jaya Everett for evaluation and management of uncontrolled type 1 diabetes. Riddhi Snowden was diagnosed with Diabetes mellitus in 2015 . Diabetes was diagnosed at OGTT when she was pregnant with her son  . Riddhi Snowden got diabetic education in the past.  Pt is noted to C pep < 1.0 in 2017. She has been on insulin for years  Comorbid conditions: Neuropathy  She stopped insulin after delivery but within 5 days she was admitted with DKA and sincethen she has been on insulin   She has Asthma and she is on inhalers. INTERIM:    Diabetes  She presents for her follow-up diabetic visit. She has type 1 diabetes mellitus. MedicAlert identification noted. The initial diagnosis of diabetes was made 5 years ago. Her disease course has been worsening. Hypoglycemia symptoms include sweats and tremors. Associated symptoms include foot paresthesias. There are no hypoglycemic complications. Symptoms are worsening. There are no diabetic complications. Current diabetic treatment includes insulin injections and oral agent (dual therapy). She is compliant with treatment most of the time. Her weight is stable. She is following a generally healthy and diabetic diet. Meal planning includes avoidance of concentrated sweets. She has had a previous visit with a dietitian. She participates in exercise daily. Her breakfast blood glucose is taken between 7-8 am. Her breakfast blood glucose range is generally 70-90 mg/dl. Her lunch blood glucose is taken between 11-12 pm. Her lunch blood glucose range is generally 140-180 mg/dl. Her dinner blood glucose is taken between 6-7 pm. Her dinner blood glucose range is generally 140-180 mg/dl. An ACE inhibitor/angiotensin II receptor blocker is not being taken. She sees a podiatrist.Eye exam is current.           Patient is on the OmniPod insulin pump, denies any unexplained hypoglycemia  Ozempic was not covered,  Stop Metformin in (DEXCOM G6 TRANSMITTER) MISC To check glucose change one every 3 months 1 each 3    Insulin Pen Needle 31G X 8 MM MISC Inject insulin 4 times daily. 600 each 5    Glucagon (BAQSIMI ONE PACK) 3 MG/DOSE POWD To be used in case of severe hypoglycemia 2 each 3    albuterol sulfate  (90 Base) MCG/ACT inhaler Inhale 1 puff into the lungs every 4 hours as needed      albuterol (PROVENTIL) (2.5 MG/3ML) 0.083% nebulizer solution Inhale 2.5 mg into the lungs every 6 hours as needed      fexofenadine (ALLEGRA) 60 MG tablet Take 60 mg by mouth daily      FREESTYLE LITE strip       valACYclovir (VALTREX) 500 MG tablet Take 500 mg by mouth daily      insulin glargine (LANTUS SOLOSTAR) 100 UNIT/ML injection pen Inject 18 units into the skin daily. (Patient not taking: Reported on 8/25/2022) 10 pen 0     No current facility-administered medications for this visit. Allergies   Allergen Reactions    Food Swelling     WALNUTS: tongue swells up     Family Status   Relation Name Status    MUnc  (Not Specified)   ROS   I have reviewed the review of system questionnaire filled by the patient .   Patient was advised to contact PCP for non endocrine signs and symptoms     OBJECTIVE:  /88   Pulse 66   Resp 16   Ht 5' 5\" (1.651 m)   Wt 166 lb (75.3 kg)   BMI 27.62 kg/m²    Wt Readings from Last 3 Encounters:   08/25/22 166 lb (75.3 kg)   04/21/22 163 lb (73.9 kg)   12/09/21 157 lb 3.2 oz (71.3 kg)     Constitutional: no acute distress, well appearing and well nourished  Psychiatric: oriented to person, place and time, judgement and insight and normal, recent and remote memory intact and mood and affect are normal  Skin: skin and subcutaneous tissue is normal without visible mass,   Head and Face: visual inspection  of head and face revealed no abnormalities  Eyes: visual inspection showed no lid or conjunctival swelling, erythema or discharge, pupils are normal, equal, round  Ears/Nose: external inspection of ears and nose revealed no abnormalities, hearing is grossly normal  Oropharynx/Mouth/Face: lips, tongue and gums appear  normal with no lesions, the voice quality was normal  Neck: neck appears symmetric, with no visible masses,   Pulmonary: no increased work of breathing or signs of respiratory distress,  Musculoskeletal: normal on inspection    Neurological: normal coordination and normal general cortical function      Lab Results   Component Value Date/Time    LABA1C 6.8 08/19/2022 07:13 AM    LABA1C 7.4 04/07/2022 08:46 AM    LABA1C 7.0 11/23/2021 07:53 AM         ASSESSMENT/PLAN:    --- Type 1 diabetes, uncontrolled, with neuropathy A1c 7.9 in January 2021>>6.4>>7.2  C peptide <0.30 GODFREY negative. Aic 6.8    I reviewed her pump and and Sensor data with  her in detail   Omnipod switch to Omnipod 5 so patient can begin closed-loop system  Ozempic not covered. She is using meal boluses as per 10:1, stressed that she needs to take her meal boluses 10 minutes prior to eating which   Stopped trulicity due to injection site issues   Stop Metformin    Hypoglycemia protocol reviewed in detail with patient Patient was advised to carry glucose tablets and also have glucagon emergency kit. Patient was advised that sending of her fingerstick blood glucose logs is crucial in management of her  diabetes. I will adjust the  doses of diabetic medications  according to sent data. Health Maintenance       Last Eye Exam: advised to have annual dilated eye exam. her last eye exam was in nov 2020   Last Podiatry Exam:  Last foot exam was in July 2020 with Dr Sunday Arboleda   Lipids: Last LDL level was 74 in aug 2022   Microalbumin/Creatinine Ratio: normal in jan 2021     . Education: Reviewed ABCs of diabetes management (respective goals in parentheses): A1C (<7), blood pressure (<130/80), and cholesterol (LDL <100).     HAshimotos thyroiditis   TPO positive 54   TSH 1.1>>0.90 in aug 2022   I discussed with her that eventually she probably would need to be on thyroid medication currently since the TSH is normal we will continue to monitor by serial thyroid function test she denies any extreme fatigue constipation or changes in skin or hair texture    --- Mild intermittent asthma without complication  She is on inhalers     Seasonal allergies  Stable      Reviewed and/or ordered clinical lab results yes   Reviewed and/or ordered radiology tests Yes  Reviewed and/or ordered other diagnostic tests yes   Made a decision to obtain old records yes   Reviewed and summarized old records yes     Mehnaz Sandy was counseled regarding symptoms of current diagnosis, course and complications of disease if inadequately treated, side effects of medications, diagnosis, treatment options, and prognosis, risks, benefits, complications, and alternatives of treatment, labs, imaging and other studies and treatment targets and goals. She understands instructions and counseling      No follow-ups on file. Please note that some or all of this report was generated using voice recognition software. Please notify me in case of any questions about the content of this document, as some errors in transcription may have occurred . Diabetes Continuous Glucose Monitoring Report         Reason for Study:     - improve diabetic control without risk of hypoglycemia       Current Medication regimen:   lantus 18 units    novolog as per SSI + CIR 15 :1     CGMS Report     CGMS data collection was performed on August 25, 2022   Patient provided information on her  diet, activities and insulin dosing  during this period. Data was available for 14  days     Sensor Data Report:   - 12 AM to 6 AM: Overnight blood glucose pattern shows  - 6   AM to 10 AM:  Post breakfast  is noted  --10AM to 5 PM :  hypoglycemia observed during this time.   - 5   PM to 8 PM: Post meal hyperglycemia   is noted       Average [id number] mg/dL     Standard Dev 50 mg/dL   % of time <70 mg/dL 1 % % of time >180  Mg/dL 26  %   % of time within range 73 %  Number of hypoglycemia episodes noted: 0     Impression:   CGMS shows overnight hypoglycemia      Recommendation:      Patient was advised to make the following changes in her diabetic regimen  Switch to OmniPod 5

## 2023-01-23 ENCOUNTER — HOSPITAL ENCOUNTER (OUTPATIENT)
Age: 33
Discharge: HOME | End: 2023-01-23
Payer: OTHER GOVERNMENT

## 2023-01-23 LAB
A/G RATIO: 1.6 (ref 1.1–2.2)
ALBUMIN SERPL-MCNC: 4.1 G/DL (ref 3.4–5)
ALP BLD-CCNC: 45 U/L (ref 40–129)
ALT SERPL-CCNC: 9 U/L (ref 10–40)
ANION GAP SERPL CALCULATED.3IONS-SCNC: 12 MMOL/L (ref 3–16)
AST SERPL-CCNC: 15 U/L (ref 15–37)
BILIRUB SERPL-MCNC: 0.4 MG/DL (ref 0–1)
BUN BLDV-MCNC: 14 MG/DL (ref 7–20)
CALCIUM SERPL-MCNC: 9 MG/DL (ref 8.3–10.6)
CHLORIDE BLD-SCNC: 105 MMOL/L (ref 99–110)
CHOLESTEROL, TOTAL: 148 MG/DL (ref 0–199)
CO2: 22 MMOL/L (ref 21–32)
CREAT SERPL-MCNC: 0.7 MG/DL (ref 0.6–1.1)
CREATININE URINE: 96.2 MG/DL (ref 28–259)
ESTIMATED AVERAGE GLUCOSE: 137 MG/DL
GFR SERPL CREATININE-BSD FRML MDRD: >60 ML/MIN/{1.73_M2}
GLUCOSE BLD-MCNC: 102 MG/DL (ref 70–99)
HBA1C MFR BLD: 6.4 %
HDLC SERPL-MCNC: 81 MG/DL (ref 40–60)
LDL CHOLESTEROL CALCULATED: 60 MG/DL
MICROALBUMIN UR-MCNC: <1.2 MG/DL
MICROALBUMIN/CREAT UR-RTO: NORMAL MG/G (ref 0–30)
POTASSIUM SERPL-SCNC: 4.3 MMOL/L (ref 3.5–5.1)
SODIUM BLD-SCNC: 139 MMOL/L (ref 136–145)
TOTAL PROTEIN: 6.7 G/DL (ref 6.4–8.2)
TRIGL SERPL-MCNC: 33 MG/DL (ref 0–150)
TSH SERPL DL<=0.05 MIU/L-ACNC: 1.2 UIU/ML (ref 0.27–4.2)
VLDLC SERPL CALC-MCNC: 7 MG/DL

## 2023-01-23 PROCEDURE — 36415 COLL VENOUS BLD VENIPUNCTURE: CPT

## 2023-01-23 PROCEDURE — 83036 HEMOGLOBIN GLYCOSYLATED A1C: CPT

## 2023-01-23 PROCEDURE — 80053 COMPREHEN METABOLIC PANEL: CPT

## 2023-01-23 PROCEDURE — 84443 ASSAY THYROID STIM HORMONE: CPT

## 2023-01-23 PROCEDURE — 82043 UR ALBUMIN QUANTITATIVE: CPT

## 2023-01-23 PROCEDURE — 82570 ASSAY OF URINE CREATININE: CPT

## 2023-01-23 PROCEDURE — 80061 LIPID PANEL: CPT

## 2023-02-01 ENCOUNTER — OFFICE VISIT (OUTPATIENT)
Dept: ENDOCRINOLOGY | Age: 33
End: 2023-02-01
Payer: OTHER GOVERNMENT

## 2023-02-01 VITALS
WEIGHT: 170 LBS | DIASTOLIC BLOOD PRESSURE: 99 MMHG | HEIGHT: 65 IN | SYSTOLIC BLOOD PRESSURE: 136 MMHG | HEART RATE: 79 BPM | RESPIRATION RATE: 16 BRPM | BODY MASS INDEX: 28.32 KG/M2

## 2023-02-01 DIAGNOSIS — E06.3 HASHIMOTO'S THYROIDITIS: ICD-10-CM

## 2023-02-01 DIAGNOSIS — E66.3 OVERWEIGHT: ICD-10-CM

## 2023-02-01 DIAGNOSIS — Z79.4 LONG-TERM INSULIN USE (HCC): ICD-10-CM

## 2023-02-01 DIAGNOSIS — J45.20 MILD INTERMITTENT ASTHMA WITHOUT COMPLICATION: ICD-10-CM

## 2023-02-01 DIAGNOSIS — E10.9 TYPE 1 DIABETES MELLITUS WITHOUT COMPLICATION (HCC): Primary | ICD-10-CM

## 2023-02-01 PROCEDURE — 3044F HG A1C LEVEL LT 7.0%: CPT | Performed by: INTERNAL MEDICINE

## 2023-02-01 PROCEDURE — 95251 CONT GLUC MNTR ANALYSIS I&R: CPT | Performed by: INTERNAL MEDICINE

## 2023-02-01 PROCEDURE — 99214 OFFICE O/P EST MOD 30 MIN: CPT | Performed by: INTERNAL MEDICINE

## 2023-02-01 RX ORDER — INSULIN PUMP CONTROLLER
EACH MISCELLANEOUS
Qty: 30 EACH | Refills: 3 | Status: SHIPPED | OUTPATIENT
Start: 2023-02-01

## 2023-02-07 ENCOUNTER — PATIENT MESSAGE (OUTPATIENT)
Dept: ENDOCRINOLOGY | Age: 33
End: 2023-02-07

## 2023-02-07 DIAGNOSIS — E10.9 TYPE 1 DIABETES MELLITUS WITHOUT COMPLICATION (HCC): ICD-10-CM

## 2023-02-07 DIAGNOSIS — Z79.4 LONG-TERM INSULIN USE (HCC): ICD-10-CM

## 2023-02-07 RX ORDER — INSULIN PUMP CONTROLLER
EACH MISCELLANEOUS
Qty: 30 EACH | Refills: 3 | Status: SHIPPED | OUTPATIENT
Start: 2023-02-07

## 2023-02-07 NOTE — TELEPHONE ENCOUNTER
From: Divine Nguyễn  To: Dr. Feli Almanza  Sent: 2/7/2023 12:13 PM EST  Subject: Omni pod dash     Hello  [facility] just informed me that they will no longer cover the Omnipod and to contact a local pharmacy. I contacted Eric on [address] in [place] stated they had Omni pods. can you please send a 90 day script over to them.   Thanks

## 2023-07-21 LAB
A/G RATIO: 1.4 (ref 1–2)
ALBUMIN SERPL-MCNC: 3.9 G/DL (ref 3.5–5.7)
ALBUMIN/PROTEIN TOTAL, SER/PL: 6.6 G/DL (ref 6–8.3)
ALP BLD-CCNC: 33 U/L (ref 34–104)
ALT SERPL-CCNC: 8 U/L (ref 7–52)
ANION GAP 4: 10 MMOL/L (ref 7–16)
AST W/O P-5'-P: 13 U/L (ref 13–39)
BILIRUB SERPL-MCNC: 0.5 MG/DL (ref 0.3–1)
BUN BLDV-MCNC: 13 MG/DL (ref 7–25)
CALCIUM SERPL-MCNC: 9 MG/DL (ref 8.6–10.2)
CHLORIDE BLD-SCNC: 105 MMOL/L (ref 98–107)
CHOLESTEROL, STONE: 178 MG/DL
CO2: 22 MMOL/L (ref 21–31)
CREAT SERPL-MCNC: 0.92 MG/DL (ref 0.6–1.2)
CREATININE URINE: 190.41 MG/DL
DEPRECATED MEAN BLOOD GLUCOSE: 148 MG/DL (ref 68–126)
EGFR FEMALE: 84 ML/MIN/1.73M2
GLOBULIN: 2.7 G/DL (ref 2.6–4.2)
GLUCOSE: 126 MG/DL (ref 74–109)
HBA1C MFR BLD: 6.8 % (ref 4–6)
HDLC SERPL-MCNC: 100 MG/DL (ref 40–60)
LDL CHOLESTEROL CALCULATED: 62 MG/DL (ref 0–99)
MICROALBUMIN UR-MCNC: 9.1 MG/L
MICROALBUMIN/CREAT UR-RTO: 4.8 MG ALB/G CREAT
POTASSIUM SERPL-SCNC: 3.8 MMOL/L (ref 3.5–5.1)
SODIUM BLD-SCNC: 137 MMOL/L (ref 136–145)
TRIGL SERPL-MCNC: 80 MG/DL
TSH ULTRASENSITIVE: 1.14 UIU/ML (ref 0.45–5.33)
VITAMIN D 25-HYDROXY: 85 NG/ML
VLDLC SERPL CALC-MCNC: 16 MG/DL (ref 0–40)

## 2023-08-01 ENCOUNTER — OFFICE VISIT (OUTPATIENT)
Dept: ENDOCRINOLOGY | Age: 33
End: 2023-08-01
Payer: OTHER GOVERNMENT

## 2023-08-01 VITALS
HEART RATE: 80 BPM | SYSTOLIC BLOOD PRESSURE: 128 MMHG | WEIGHT: 168 LBS | DIASTOLIC BLOOD PRESSURE: 83 MMHG | BODY MASS INDEX: 27.96 KG/M2

## 2023-08-01 DIAGNOSIS — E03.8 HYPOTHYROIDISM DUE TO HASHIMOTO'S THYROIDITIS: ICD-10-CM

## 2023-08-01 DIAGNOSIS — E66.3 OVERWEIGHT: ICD-10-CM

## 2023-08-01 DIAGNOSIS — E10.9 TYPE 1 DIABETES MELLITUS WITHOUT COMPLICATION (HCC): Primary | ICD-10-CM

## 2023-08-01 DIAGNOSIS — E06.3 HYPOTHYROIDISM DUE TO HASHIMOTO'S THYROIDITIS: ICD-10-CM

## 2023-08-01 PROCEDURE — 99214 OFFICE O/P EST MOD 30 MIN: CPT | Performed by: INTERNAL MEDICINE

## 2023-08-01 PROCEDURE — 3044F HG A1C LEVEL LT 7.0%: CPT | Performed by: INTERNAL MEDICINE

## 2023-08-01 PROCEDURE — 95251 CONT GLUC MNTR ANALYSIS I&R: CPT | Performed by: INTERNAL MEDICINE

## 2023-08-01 NOTE — PROGRESS NOTES
Richard Calhoun is a 34 y.o. female is referred to me by Dr Anitra Mercedes for evaluation and management of uncontrolled type 1 diabetes. Richard Calhoun was diagnosed with Diabetes mellitus in 2015 . Diabetes was diagnosed at OGTT when she was pregnant with her son  . Richard Calhoun got diabetic education in the past.  Pt is noted to C pep < 1.0 in 2017. She has been on insulin for years  Comorbid conditions: Neuropathy  She stopped insulin after delivery but within 5 days she was admitted with DKA and sincethen she has been on insulin   She has Asthma and she is on inhalers. INTERIM:    Diabetes  She presents for her follow-up diabetic visit. She has type 1 diabetes mellitus. MedicAlert identification noted. The initial diagnosis of diabetes was made 5 years ago. Her disease course has been worsening. Hypoglycemia symptoms include sweats and tremors. Associated symptoms include foot paresthesias. There are no hypoglycemic complications. Symptoms are worsening. There are no diabetic complications. Current diabetic treatment includes insulin injections and oral agent (dual therapy). She is compliant with treatment most of the time. Her weight is stable. She is following a generally healthy and diabetic diet. Meal planning includes avoidance of concentrated sweets. She has had a previous visit with a dietitian. She participates in exercise daily. Her breakfast blood glucose is taken between 7-8 am. Her breakfast blood glucose range is generally 70-90 mg/dl. Her lunch blood glucose is taken between 11-12 pm. Her lunch blood glucose range is generally 140-180 mg/dl. Her dinner blood glucose is taken between 6-7 pm. Her dinner blood glucose range is generally 140-180 mg/dl. An ACE inhibitor/angiotensin II receptor blocker is not being taken. She sees a podiatrist.Eye exam is current.           Patient is on the OmniPod insulin pump, denies any unexplained hypoglycemia  Ozempic was not covered,  Stop Metformin in

## 2023-08-21 ENCOUNTER — TELEPHONE (OUTPATIENT)
Dept: ENDOCRINOLOGY | Age: 33
End: 2023-08-21

## 2023-08-21 DIAGNOSIS — E10.9 TYPE 1 DIABETES MELLITUS WITHOUT COMPLICATION (HCC): ICD-10-CM

## 2023-08-21 DIAGNOSIS — Z79.4 LONG-TERM INSULIN USE (HCC): ICD-10-CM

## 2023-08-21 RX ORDER — INSULIN PUMP CONTROLLER
EACH MISCELLANEOUS
Qty: 30 EACH | Refills: 3 | Status: SHIPPED | OUTPATIENT
Start: 2023-08-21

## 2023-08-21 NOTE — TELEPHONE ENCOUNTER
Fax from [employer] w/ 90 day refill request for Omnipod Dash Pack 5's Gen 4    LOV   8-1-23  FOV   2-1-24

## 2023-08-23 ENCOUNTER — PATIENT MESSAGE (OUTPATIENT)
Dept: ENDOCRINOLOGY | Age: 33
End: 2023-08-23

## 2023-08-23 ENCOUNTER — TELEPHONE (OUTPATIENT)
Dept: ENDOCRINOLOGY | Age: 33
End: 2023-08-23

## 2023-08-23 NOTE — TELEPHONE ENCOUNTER
Fax from [employer] w/ Prior Auth forms for [employer] to complete w/ questions

## 2023-12-11 ENCOUNTER — PATIENT MESSAGE (OUTPATIENT)
Dept: ENDOCRINOLOGY | Age: 33
End: 2023-12-11

## 2023-12-11 DIAGNOSIS — E10.9 TYPE 1 DIABETES MELLITUS WITHOUT COMPLICATION (HCC): Primary | ICD-10-CM

## 2023-12-11 RX ORDER — BLOOD-GLUCOSE METER
1 KIT MISCELLANEOUS 3 TIMES DAILY
Qty: 100 EACH | Refills: 0 | Status: SHIPPED | OUTPATIENT
Start: 2023-12-11

## 2023-12-11 NOTE — TELEPHONE ENCOUNTER
From: Vic Nguyễn  To: Dr. Almas Calderon  Sent: 12/11/2023 10:21 AM EST  Subject: Freestyle lite test strips     Good morning,   Can I please have a have a month supply prescription of my freestyle lite test strips sent to [address] [place] [address]. My Dexcom refills are not shipping until Wednesday and I will have to use my blood sugar machine to test my blood sugar until then.    Thanks

## 2024-01-26 LAB
A/G RATIO: 1.5 (ref 1–2)
ALBUMIN SERPL-MCNC: 4.2 G/DL (ref 3.5–5.7)
ALP BLD-CCNC: 42 U/L (ref 34–104)
ALT SERPL-CCNC: 10 U/L (ref 7–52)
ANION GAP SERPL CALCULATED.3IONS-SCNC: 11 MMOL/L (ref 7–16)
AST SERPL-CCNC: 14 U/L (ref 13–39)
BILIRUB SERPL-MCNC: 0.5 MG/DL (ref 0.3–1)
BUN BLDV-MCNC: 12 MG/DL (ref 7–25)
CALCIUM SERPL-MCNC: 8.9 MG/DL (ref 8.6–10.2)
CHLORIDE BLD-SCNC: 103 MMOL/L (ref 98–107)
CHOLESTEROL: 180 MG/DL
CO2: 24 MMOL/L (ref 21–31)
CREAT SERPL-MCNC: 0.86 MG/DL (ref 0.6–1.2)
CREATININE, RANDOM URINE: 185.8 MG/DL
EGFR FEMALE: >90 ML/MIN/1.73M2
GLOBULIN: 2.8 G/DL (ref 2.6–4.2)
GLUCOSE BLD-MCNC: 119 MG/DL (ref 74–109)
HBA1C MFR BLD: 6.8 % (ref 4–6)
HDLC SERPL-MCNC: 91 MG/DL (ref 40–60)
LDL CHOLESTEROL: 78 MG/DL (ref 0–99)
MEAN PLASMA GLUCOSE: 148 MG/DL (ref 68–126)
MICROALBUMIN/CREAT 24H UR: 7.7 MG/L
MICROALBUMIN/CREAT UR-RTO: 4.1 MG ALB/G CREAT
POTASSIUM SERPL-SCNC: 3.8 MMOL/L (ref 3.5–5.1)
SODIUM BLD-SCNC: 138 MMOL/L (ref 136–145)
TOTAL PROTEIN: 7 G/DL (ref 6–8.3)
TRIGL SERPL-MCNC: 54 MG/DL
TSH SERPL DL<=0.05 MIU/L-ACNC: 1.07 UIU/ML (ref 0.45–5.33)
VLDLC SERPL CALC-MCNC: 11 MG/DL (ref 0–40)

## 2024-01-27 DIAGNOSIS — E10.9 TYPE 1 DIABETES MELLITUS WITHOUT COMPLICATION (HCC): Primary | ICD-10-CM

## 2024-01-29 RX ORDER — INSULIN ASPART 100 [IU]/ML
INJECTION, SOLUTION INTRAVENOUS; SUBCUTANEOUS
Qty: 60 ML | Refills: 3 | Status: SHIPPED | OUTPATIENT
Start: 2024-01-29

## 2024-01-29 NOTE — TELEPHONE ENCOUNTER
Requested Prescriptions     Pending Prescriptions Disp Refills    NOVOLOG FLEXPEN 100 UNIT/ML injection pen [Pharmacy Med Name: NOVOLOG FLEXPEN SYR 3ML 5'S 100U/ML] 60 mL 3     Sig: INJECT 20 UNITS UNDER THE SKIN THREE TIMES A DAY WITH MEALS     Last OV 08/01/2023   Next OV 02/01/2024

## 2024-02-01 ENCOUNTER — TELEPHONE (OUTPATIENT)
Dept: ENDOCRINOLOGY | Age: 34
End: 2024-02-01

## 2024-02-01 ENCOUNTER — OFFICE VISIT (OUTPATIENT)
Dept: ENDOCRINOLOGY | Age: 34
End: 2024-02-01

## 2024-02-01 VITALS
WEIGHT: 172 LBS | BODY MASS INDEX: 28.66 KG/M2 | HEIGHT: 65 IN | SYSTOLIC BLOOD PRESSURE: 130 MMHG | DIASTOLIC BLOOD PRESSURE: 81 MMHG | HEART RATE: 78 BPM

## 2024-02-01 DIAGNOSIS — E10.9 TYPE 1 DIABETES MELLITUS WITHOUT COMPLICATION (HCC): Primary | ICD-10-CM

## 2024-02-01 DIAGNOSIS — E66.3 OVERWEIGHT: ICD-10-CM

## 2024-02-01 DIAGNOSIS — E06.3 HYPOTHYROIDISM DUE TO HASHIMOTO'S THYROIDITIS: ICD-10-CM

## 2024-02-01 DIAGNOSIS — Z79.4 LONG-TERM INSULIN USE (HCC): ICD-10-CM

## 2024-02-01 DIAGNOSIS — E03.8 HYPOTHYROIDISM DUE TO HASHIMOTO'S THYROIDITIS: ICD-10-CM

## 2024-02-01 RX ORDER — INSULIN LISPRO 100 [IU]/ML
INJECTION, SOLUTION INTRAVENOUS; SUBCUTANEOUS
Qty: 20 ADJUSTABLE DOSE PRE-FILLED PEN SYRINGE | Refills: 4 | Status: SHIPPED | OUTPATIENT
Start: 2024-02-01

## 2024-02-01 RX ORDER — INSULIN LISPRO 100 [IU]/ML
INJECTION, SOLUTION INTRAVENOUS; SUBCUTANEOUS
Qty: 90 ML | Refills: 1 | Status: SHIPPED | OUTPATIENT
Start: 2024-02-01

## 2024-02-01 RX ORDER — INSULIN PMP CART,AUT,G6/7,CNTR
EACH SUBCUTANEOUS
Qty: 30 EACH | Refills: 5 | Status: SHIPPED | OUTPATIENT
Start: 2024-02-01

## 2024-02-01 RX ORDER — INSULIN PMP CART,AUT,G6/7,CNTR
EACH SUBCUTANEOUS
Qty: 1 KIT | Refills: 0 | Status: SHIPPED | OUTPATIENT
Start: 2024-02-01

## 2024-02-01 NOTE — TELEPHONE ENCOUNTER
Fax from [employer] [facility] needing clarification on rx for insulin lispro    Vials are typicaly used in pumps not pens. Include directions and qty w/ your response

## 2024-02-01 NOTE — PROGRESS NOTES
each by Other route 3 times daily 100 each 0    Insulin Disposable Pump (OMNIPOD DASH PODS, GEN 4,) MISC Change every 3 days 30 each 3    fluticasone-salmeterol (ADVAIR) 250-50 MCG/ACT AEPB diskus inhaler       Continuous Blood Gluc  (DEXCOM G6 ) PRICILA Check glucose 1 each 0    Continuous Blood Gluc Sensor (DEXCOM G6 SENSOR) MISC Change every 10 days 3 each 5    Continuous Blood Gluc Transmit (DEXCOM G6 TRANSMITTER) MISC To check glucose change one every 3 months 1 each 3    Insulin Pen Needle 31G X 8 MM MISC Inject insulin 4 times daily. 600 each 5    Glucagon (BAQSIMI ONE PACK) 3 MG/DOSE POWD To be used in case of severe hypoglycemia 2 each 3    albuterol sulfate  (90 Base) MCG/ACT inhaler Inhale 1 puff into the lungs every 4 hours as needed      fexofenadine (ALLEGRA) 60 MG tablet Take 1 tablet by mouth daily      valACYclovir (VALTREX) 500 MG tablet Take 1 tablet by mouth daily      albuterol (PROVENTIL) (2.5 MG/3ML) 0.083% nebulizer solution Inhale 2.5 mg into the lungs every 6 hours as needed       No current facility-administered medications for this visit.     Allergies   Allergen Reactions    Food Swelling     WALNUTS: tongue swells up     Family Status   Relation Name Status    MUnc  (Not Specified)     OBJECTIVE:  /81 (Site: Right Upper Arm, Position: Sitting, Cuff Size: Large Adult)   Pulse 78   Ht 1.651 m (5' 5\")   Wt 78 kg (172 lb)   LMP 01/17/2024 (Approximate)   BMI 28.62 kg/m²    Wt Readings from Last 3 Encounters:   02/01/24 78 kg (172 lb)   08/01/23 76.2 kg (168 lb)   02/01/23 77.1 kg (170 lb)   Constitutional: no acute distress, well appearing, well nourished  Psychiatric: oriented to person, place and time, judgement, insight and normal, recent and remote memory and intact and mood, affect are normal  Skin: skin and subcutaneous tissue is normal without mass,   Head and Face: examination of head and face revealed no abnormalities  Eyes: no lid or conjunctival

## 2024-02-14 ENCOUNTER — TELEPHONE (OUTPATIENT)
Dept: ENDOCRINOLOGY | Age: 34
End: 2024-02-14

## 2024-03-05 ENCOUNTER — TELEPHONE (OUTPATIENT)
Dept: ENDOCRINOLOGY | Age: 34
End: 2024-03-05

## 2024-07-15 ENCOUNTER — TELEPHONE (OUTPATIENT)
Dept: ENDOCRINOLOGY | Age: 34
End: 2024-07-15

## 2024-07-18 ENCOUNTER — TELEPHONE (OUTPATIENT)
Dept: ENDOCRINOLOGY | Age: 34
End: 2024-07-18

## 2024-07-18 NOTE — TELEPHONE ENCOUNTER
[facility] requesting [employer] forms they sent on 7/15 be refaxed to [phone]. Forms sent.

## 2024-08-02 LAB
A/G RATIO: 1.4 (ref 1–2)
ALBUMIN: 3.9 G/DL (ref 3.5–5.7)
ALP BLD-CCNC: 39 U/L (ref 34–104)
ALT SERPL-CCNC: 10 U/L (ref 7–52)
ANION GAP SERPL CALCULATED.3IONS-SCNC: 8 MMOL/L (ref 7–16)
AST SERPL-CCNC: 16 U/L (ref 13–39)
BILIRUB SERPL-MCNC: 0.5 MG/DL (ref 0.3–1)
BUN BLDV-MCNC: 12 MG/DL (ref 7–25)
CALCIUM SERPL-MCNC: 9 MG/DL (ref 8.6–10.2)
CHLORIDE BLD-SCNC: 105 MMOL/L (ref 98–107)
CHOLESTEROL, TOTAL: 177 MG/DL
CO2: 26 MMOL/L (ref 21–31)
CREAT SERPL-MCNC: 0.86 MG/DL (ref 0.6–1.2)
CREATININE, RANDOM URINE: 94.09 MG/DL
EGFR FEMALE: >90 ML/MIN/1.73M2
GLOBULIN: 2.8 G/DL (ref 2.6–4.2)
GLUCOSE BLD-MCNC: 117 MG/DL (ref 74–109)
HBA1C MFR BLD: 6.5 % (ref 4–6)
HDLC SERPL-MCNC: 83 MG/DL (ref 40–60)
LDL CHOLESTEROL: 80 MG/DL (ref 0–99)
MEAN PLASMA GLUCOSE: 140 MG/DL (ref 68–126)
MICROALBUMIN/CREAT 24H UR: 192.9 MG/L
MICROALBUMIN/CREAT UR-RTO: 205 MG ALB/G CREAT
POTASSIUM SERPL-SCNC: 4 MMOL/L (ref 3.5–5.1)
SODIUM BLD-SCNC: 139 MMOL/L (ref 136–145)
TOTAL PROTEIN: 6.7 G/DL (ref 6–8.3)
TRIGL SERPL-MCNC: 69 MG/DL
TSH SERPL DL<=0.05 MIU/L-ACNC: 1.3 UIU/ML (ref 0.45–5.33)
VITAMIN D 25-HYDROXY: 57.7 NG/ML
VLDLC SERPL CALC-MCNC: 14 MG/DL (ref 0–40)

## 2024-08-06 ENCOUNTER — TELEPHONE (OUTPATIENT)
Dept: ENDOCRINOLOGY | Age: 34
End: 2024-08-06

## 2024-08-06 ENCOUNTER — PATIENT MESSAGE (OUTPATIENT)
Dept: ENDOCRINOLOGY | Age: 34
End: 2024-08-06

## 2024-08-06 DIAGNOSIS — Z79.4 LONG-TERM INSULIN USE (HCC): Primary | ICD-10-CM

## 2024-08-06 DIAGNOSIS — E10.9 TYPE 1 DIABETES MELLITUS WITHOUT COMPLICATION (HCC): ICD-10-CM

## 2024-08-06 NOTE — TELEPHONE ENCOUNTER
From: Yvrose Nguyễn  To: Dr. Keila Brink  Sent: 8/6/2024 1:05 PM EDT  Subject: Urinalysis     Hello,  My creatinine/microabumin levels were high on my urinalysis test. I was wondering if menstruation can affect these results or if I needed to take the test again. When I took the test I was on my period at the time.     Thank you.

## 2024-08-06 NOTE — TELEPHONE ENCOUNTER
Yes it can affect the results so lets repeat the labs when you are not on your menstrual cycle..

## 2024-08-06 NOTE — TELEPHONE ENCOUNTER
----- Message from Yvrose Nguyễn sent at 8/6/2024  1:05 PM EDT -----  Regarding: Urinalysis   Contact: [phone]  Hello,  My creatinine/microabumin levels were high on my urinalysis test. I was wondering if menstruation can affect these results or if I needed to take the test again. When I took the test I was on my period at the time.     Thank you.

## 2024-08-13 ENCOUNTER — OFFICE VISIT (OUTPATIENT)
Dept: ENDOCRINOLOGY | Age: 34
End: 2024-08-13
Payer: OTHER GOVERNMENT

## 2024-08-13 ENCOUNTER — TELEPHONE (OUTPATIENT)
Dept: ENDOCRINOLOGY | Age: 34
End: 2024-08-13

## 2024-08-13 VITALS
WEIGHT: 181.21 LBS | HEIGHT: 65 IN | BODY MASS INDEX: 30.19 KG/M2 | SYSTOLIC BLOOD PRESSURE: 133 MMHG | RESPIRATION RATE: 16 BRPM | HEART RATE: 82 BPM | DIASTOLIC BLOOD PRESSURE: 83 MMHG

## 2024-08-13 DIAGNOSIS — Z79.4 LONG-TERM INSULIN USE (HCC): ICD-10-CM

## 2024-08-13 DIAGNOSIS — E06.3 HYPOTHYROIDISM DUE TO HASHIMOTO'S THYROIDITIS: ICD-10-CM

## 2024-08-13 DIAGNOSIS — E10.9 TYPE 1 DIABETES MELLITUS WITHOUT COMPLICATION (HCC): ICD-10-CM

## 2024-08-13 DIAGNOSIS — E10.9 TYPE 1 DIABETES MELLITUS WITHOUT COMPLICATION (HCC): Primary | ICD-10-CM

## 2024-08-13 DIAGNOSIS — E03.8 HYPOTHYROIDISM DUE TO HASHIMOTO'S THYROIDITIS: ICD-10-CM

## 2024-08-13 DIAGNOSIS — E66.9 OBESITY (BMI 30-39.9): ICD-10-CM

## 2024-08-13 LAB
CREAT UR-MCNC: 41.7 MG/DL (ref 28–259)
MICROALBUMIN UR DL<=1MG/L-MCNC: <1.2 MG/DL
MICROALBUMIN/CREAT UR: NORMAL MG/G (ref 0–30)

## 2024-08-13 PROCEDURE — 95251 CONT GLUC MNTR ANALYSIS I&R: CPT | Performed by: INTERNAL MEDICINE

## 2024-08-13 PROCEDURE — 99214 OFFICE O/P EST MOD 30 MIN: CPT | Performed by: INTERNAL MEDICINE

## 2024-08-13 PROCEDURE — 3044F HG A1C LEVEL LT 7.0%: CPT | Performed by: INTERNAL MEDICINE

## 2024-08-13 RX ORDER — SEMAGLUTIDE 0.25 MG/.5ML
0.25 INJECTION, SOLUTION SUBCUTANEOUS
Qty: 0.5 ML | Refills: 3 | Status: SHIPPED | OUTPATIENT
Start: 2024-08-13

## 2024-08-13 RX ORDER — INSULIN PMP CART,AUT,G6/7,CNTR
EACH SUBCUTANEOUS
Qty: 1 KIT | Refills: 0 | Status: SHIPPED | OUTPATIENT
Start: 2024-08-13

## 2024-08-13 RX ORDER — INSULIN PUMP CONTROLLER
EACH MISCELLANEOUS
Qty: 30 EACH | Refills: 3 | Status: SHIPPED | OUTPATIENT
Start: 2024-08-13

## 2024-08-13 RX ORDER — INSULIN ASPART 100 [IU]/ML
INJECTION, SOLUTION INTRAVENOUS; SUBCUTANEOUS
Qty: 60 ML | Refills: 3 | Status: SHIPPED | OUTPATIENT
Start: 2024-08-13

## 2024-08-13 RX ORDER — FLUTICASONE PROPIONATE 50 MCG
1 SPRAY, SUSPENSION (ML) NASAL DAILY
COMMUNITY

## 2024-08-13 NOTE — PATIENT INSTRUCTIONS
Patient Education      Patient Education        semaglutide (oral/injection)  Pronunciation:  TRACEY a GLOO tide  Brand:  Ozempic, Ozempic (1 mg dose), Rybelsus, Wegovy (0.25 mg dose), Wegovy (0.5 mg dose), Wegovy (1 mg dose), Wegovy (1.7 mg dose), Wegovy (2.4 mg dose)  What is the most important information I should know about semaglutide?  Call your doctor at once if you have signs of a thyroid tumor, such as swelling or a lump in your neck, trouble swallowing, a hoarse voice, or shortness of breath.  You should not use semaglutide if you have multiple endocrine neoplasia type 2 (tumors in your glands), or a personal or family history of medullary thyroid cancer.  What is semaglutide?  Semaglutide (Rybelsus and Ozempic) is used with diet and exercise to improve blood sugar control in adults with type 2 diabetes mellitus (not for type 1 diabetes).  Wegovy is used with diet and exercise to manage weight in overweight adults who also have least one weight-related medical condition such as type 2 diabetes, high blood pressure, or high cholesterol.  Semaglutide may also be used for purposes not listed in this medication guide.  What should I discuss with my healthcare provider before using semaglutide?  You should not use semaglutide if you are allergic to it, or if you have:  multiple endocrine neoplasia type 2 (tumors in your glands); or  a personal or family history of medullary thyroid carcinoma (a type of thyroid cancer).  Tell your doctor if you have ever had:  a stomach or intestinal disorder;  pancreatitis;  kidney disease; or  eye problems caused by diabetes (retinopathy).  In animal studies, semaglutide caused thyroid tumors or thyroid cancer.  It is not known whether these effects would occur in people. Ask your doctor about your risk.  Stop using this medicine at least 2 months before you plan to get pregnant. Ask your doctor for a safer medicine to use during this time. Controlling diabetes is very important

## 2024-08-13 NOTE — TELEPHONE ENCOUNTER
Submitted PA for Wegovy  Via [facility] Key: [id number] STATUS: PENDING.    Follow up done daily; if no decision with in three days we will refax.  If another three days goes by with no decision will call the insurance for status.

## 2024-08-13 NOTE — PROGRESS NOTES
Yvrose Nguyễn is a 33 y.o. female is seen for uncontrolled type 1 diabetes. Yvrose Nguyễn was diagnosed with Diabetes mellitus in 2015 .  Diabetes was diagnosed at OGTT when she was pregnant with her son  .  Yvrose Nguyễn got diabetic education in the past.  Pt is noted to C pep < 1.0 in 2017.  She has been on insulin for years  Comorbid conditions: Neuropathy  She stopped insulin after delivery but within 5 days she was admitted with DKA and sincethen she has been on insulin   She has Asthma and she is on inhalers.    INTERIM:    Diabetes  She presents for her follow-up diabetic visit. She has type 1 diabetes mellitus. MedicAlert identification noted. The initial diagnosis of diabetes was made 5 years ago. Her disease course has been worsening. Hypoglycemia symptoms include sweats and tremors. Associated symptoms include foot paresthesias. There are no hypoglycemic complications. Symptoms are worsening. There are no diabetic complications. Current diabetic treatment includes insulin injections and oral agent (dual therapy). She is compliant with treatment most of the time. Her weight is stable. She is following a generally healthy and diabetic diet. Meal planning includes avoidance of concentrated sweets. She has had a previous visit with a dietitian. She participates in exercise daily. Her breakfast blood glucose is taken between 7-8 am. Her breakfast blood glucose range is generally 70-90 mg/dl. Her lunch blood glucose is taken between 11-12 pm. Her lunch blood glucose range is generally 140-180 mg/dl. Her dinner blood glucose is taken between 6-7 pm. Her dinner blood glucose range is generally 140-180 mg/dl. An ACE inhibitor/angiotensin II receptor blocker is not being taken. She sees a podiatrist.Eye exam is current.      Weight gain 9 lbs   Patient is on the OmniPod insulin pump, denies any unexplained hypoglycemia  Ozempic was not covered,  Stopped  Metformin in July 2021        Past Medical

## 2024-08-14 ENCOUNTER — TELEPHONE (OUTPATIENT)
Dept: ENDOCRINOLOGY | Age: 34
End: 2024-08-14

## 2024-08-14 NOTE — TELEPHONE ENCOUNTER
Please advise patient insurance is not covering GLP-1 agonist but would recommend patient trying Adipex or Qsymia first we can send a prescription for Qsymia if patient agreeable at the office visit I did give her a handout about Qsymia hopefully she has tried it and understands all possible side effects associated with the medication.

## 2024-08-14 NOTE — TELEPHONE ENCOUNTER
PA was submitted yesterday in different encounter. PA is still pending.     If this requires a response please respond to the pool ( P MHCX PSC MEDICATION PRE-AUTH).      Thank you please advise patient.

## 2024-08-15 DIAGNOSIS — E66.9 OBESITY (BMI 30-39.9): Primary | ICD-10-CM

## 2024-08-15 RX ORDER — PHENTERMINE AND TOPIRAMATE 7.5; 46 MG/1; MG/1
CAPSULE, EXTENDED RELEASE ORAL
Qty: 30 CAPSULE | Refills: 2 | Status: SHIPPED | OUTPATIENT
Start: 2024-08-15 | End: 2024-10-15

## 2024-08-15 RX ORDER — PHENTERMINE AND TOPIRAMATE 3.75; 23 MG/1; MG/1
CAPSULE, EXTENDED RELEASE ORAL
Qty: 14 CAPSULE | Refills: 0 | Status: SHIPPED | OUTPATIENT
Start: 2024-08-15 | End: 2024-08-29

## 2024-12-06 LAB
A/G RATIO: 1.6 (ref 1–2)
ALBUMIN: 4.1 G/DL (ref 3.5–5.7)
ALP BLD-CCNC: 43 U/L (ref 34–104)
ALT SERPL-CCNC: 12 U/L (ref 7–52)
ANION GAP SERPL CALCULATED.3IONS-SCNC: 6 MMOL/L (ref 7–16)
AST SERPL-CCNC: 14 U/L (ref 13–39)
BILIRUB SERPL-MCNC: 0.5 MG/DL (ref 0.3–1)
BUN BLDV-MCNC: 18 MG/DL (ref 7–25)
CALCIUM SERPL-MCNC: 9.1 MG/DL (ref 8.6–10.2)
CHLORIDE BLD-SCNC: 107 MMOL/L (ref 98–107)
CHOLESTEROL, TOTAL: 195 MG/DL
CO2: 24 MMOL/L (ref 21–31)
CREAT SERPL-MCNC: 0.9 MG/DL (ref 0.6–1.2)
CREATININE, RANDOM URINE: 144.58 MG/DL
EGFR FEMALE: 85 ML/MIN/1.73M2
GLOBULIN: 2.6 G/DL (ref 2.6–4.2)
GLUCOSE BLD-MCNC: 155 MG/DL (ref 74–109)
HBA1C MFR BLD: 7.3 % (ref 4–6)
HDLC SERPL-MCNC: 99 MG/DL (ref 40–60)
LDL CHOLESTEROL: 81 MG/DL (ref 0–99)
MEAN PLASMA GLUCOSE: 163 MG/DL (ref 68–126)
MICROALBUMIN/CREAT 24H UR: <7 MG/L
MICROALBUMIN/CREAT UR-RTO: NORMAL
POTASSIUM SERPL-SCNC: 4 MMOL/L (ref 3.5–5.1)
SODIUM BLD-SCNC: 137 MMOL/L (ref 136–145)
TOTAL PROTEIN: 6.7 G/DL (ref 6–8.3)
TRIGL SERPL-MCNC: 73 MG/DL
TSH SERPL DL<=0.05 MIU/L-ACNC: 1.51 UIU/ML (ref 0.45–5.33)
VLDLC SERPL CALC-MCNC: 15 MG/DL (ref 0–40)

## 2024-12-10 ENCOUNTER — TELEPHONE (OUTPATIENT)
Dept: ENDOCRINOLOGY | Age: 34
End: 2024-12-10

## 2024-12-11 ENCOUNTER — OFFICE VISIT (OUTPATIENT)
Dept: ENDOCRINOLOGY | Age: 34
End: 2024-12-11

## 2024-12-11 VITALS
HEIGHT: 65 IN | HEART RATE: 90 BPM | BODY MASS INDEX: 29.89 KG/M2 | SYSTOLIC BLOOD PRESSURE: 137 MMHG | RESPIRATION RATE: 16 BRPM | WEIGHT: 179.4 LBS | DIASTOLIC BLOOD PRESSURE: 87 MMHG

## 2024-12-11 DIAGNOSIS — Z79.4 LONG-TERM INSULIN USE (HCC): ICD-10-CM

## 2024-12-11 DIAGNOSIS — E06.3 HYPOTHYROIDISM DUE TO HASHIMOTO'S THYROIDITIS: ICD-10-CM

## 2024-12-11 DIAGNOSIS — E10.9 TYPE 1 DIABETES MELLITUS WITHOUT COMPLICATION (HCC): Primary | ICD-10-CM

## 2024-12-11 DIAGNOSIS — E10.9 TYPE 1 DIABETES MELLITUS WITHOUT COMPLICATION (HCC): ICD-10-CM

## 2024-12-11 DIAGNOSIS — J45.20 MILD INTERMITTENT ASTHMA WITHOUT COMPLICATION: ICD-10-CM

## 2024-12-11 RX ORDER — BLOOD-GLUCOSE METER
1 KIT MISCELLANEOUS 3 TIMES DAILY
Qty: 300 EACH | Refills: 0 | Status: SHIPPED | OUTPATIENT
Start: 2024-12-11

## 2024-12-11 RX ORDER — INSULIN PUMP CONTROLLER
EACH MISCELLANEOUS
Qty: 30 EACH | Refills: 0 | Status: SHIPPED | OUTPATIENT
Start: 2024-12-11

## 2024-12-11 RX ORDER — INSULIN PUMP CONTROLLER
EACH MISCELLANEOUS
Qty: 30 EACH | Refills: 0 | Status: SHIPPED | OUTPATIENT
Start: 2024-12-11 | End: 2024-12-11 | Stop reason: SDUPTHER

## 2024-12-11 NOTE — PROGRESS NOTES
covered by her insurance   Stopped trulicity due to injection site issues   Stopped metformin    Hypoglycemia protocol reviewed in detail with patient Patient was advised to carry glucose tablets and also have glucagon emergency kit.    Health Maintenance       Last Eye Exam: advised to have annual dilated eye exam. her last eye exam was in nov 2023  Last Podiatry Exam: Discussed annual foot care  Lipids: Last LDL level was 74 in aug 2022   Microalbumin/Creatinine Ratio: normal in jan 2021       OBESITY   I had a lengthy discussion with the patient regarding caloric restriction as well as stepping up exercise.  We discussed caloric goal in detail.  Also discussed with patient the utility of mobile phone apps like \" my fitness Pal \" or \"Lose It \".   Patient verbalized understanding and agrees to work on this aspect.  she has no stigma of  Cushing disease or untreated thyroid disorder.   Will like to try GLP-1 sent RX for wegovy   She is interested in Qsymia but has nit started it   All possible Side effects were discussed in detail with patient in detail and patient was advised to call our office if she  notes any side effects shewas given are written handout detailing side effects from the medication.       HAshimotos thyroiditis   TPO positive 54   TSH still in the normal range in January 2023  Will continue to monitor    --- Mild intermittent asthma without complication  She is on inhalers     Seasonal allergies  Stable      Reviewed and/or ordered clinical lab results yes   Reviewed and/or ordered radiology tests Yes  Reviewed and/or ordered other diagnostic tests yes   Made a decision to obtain old records yes   Reviewed and summarized old records yes     Yvrose Nguyễn was counseled regarding symptoms of current diagnosis, course and complications of disease if inadequately treated, side effects of medications, diagnosis, treatment options, and prognosis, risks, benefits, complications, and alternatives of

## 2024-12-13 ENCOUNTER — PATIENT MESSAGE (OUTPATIENT)
Dept: ENDOCRINOLOGY | Age: 34
End: 2024-12-13

## 2024-12-13 ENCOUNTER — TELEPHONE (OUTPATIENT)
Dept: ENDOCRINOLOGY | Age: 34
End: 2024-12-13

## 2024-12-16 ENCOUNTER — TELEPHONE (OUTPATIENT)
Dept: ENDOCRINOLOGY | Age: 34
End: 2024-12-16

## 2024-12-16 RX ORDER — INSULIN GLARGINE 100 [IU]/ML
INJECTION, SOLUTION SUBCUTANEOUS
Qty: 5 ADJUSTABLE DOSE PRE-FILLED PEN SYRINGE | Refills: 3 | Status: SHIPPED | OUTPATIENT
Start: 2024-12-16

## 2024-12-16 NOTE — TELEPHONE ENCOUNTER
Submitted PA for Omnipod Pods  Via [facility] Key: [id number] STATUS: PENDING.    Follow up done daily; if no decision with in three days we will refax.  If another three days goes by with no decision will call the insurance for status.

## 2024-12-19 NOTE — TELEPHONE ENCOUNTER
Called insurance and spoke with [employer]. PA is still pending. I asked if it could be marked urgent. She stated she would put that note on the PA and push it through.     If this requires a response please respond to the pool ( P MHCX PSC MEDICATION PRE-AUTH).      Thank you please advise patient.

## 2024-12-20 NOTE — TELEPHONE ENCOUNTER
Received fax requesting additional information. Completed fax and sent to plan. Received note back stating they require physician's signature and will not accept mine. Fax scanned in media. Please have MD sign above my signature on form. You may fax to plan when signed or route back to PA dept. Thanks!    If this requires a response please respond to the pool ( P MHCX PSC MEDICATION PRE-AUTH).      Thank you please advise patient.

## 2025-01-08 ENCOUNTER — PATIENT MESSAGE (OUTPATIENT)
Dept: ENDOCRINOLOGY | Age: 35
End: 2025-01-08

## 2025-01-09 ENCOUNTER — TELEPHONE (OUTPATIENT)
Dept: ENDOCRINOLOGY | Age: 35
End: 2025-01-09

## 2025-01-13 NOTE — TELEPHONE ENCOUNTER
There was a PA received for Ozempic, but there is not a current RX on file.    If you want PA please submit new RX, and resend this PA request back to the pool    If this requires a response please respond to the pool ( P MHCX PSC MEDICATION PRE-AUTH).      Thank you please advise patient.

## 2025-01-22 ENCOUNTER — PATIENT MESSAGE (OUTPATIENT)
Dept: ENDOCRINOLOGY | Age: 35
End: 2025-01-22

## 2025-01-24 ENCOUNTER — TELEPHONE (OUTPATIENT)
Dept: ENDOCRINOLOGY | Age: 35
End: 2025-01-24

## 2025-01-24 NOTE — TELEPHONE ENCOUNTER
PA was submitted on 1/22 and is still currently pending with [place]. [place] is currently past their 5 day turnaround and are working as quickly as possible but it may be longer than 5 days. Thanks!    If this requires a response please respond to the pool ( P MHCX PSC MEDICATION PRE-AUTH).      Thank you please advise patient.

## 2025-02-05 ENCOUNTER — TELEPHONE (OUTPATIENT)
Dept: ENDOCRINOLOGY | Age: 35
End: 2025-02-05

## 2025-02-05 NOTE — TELEPHONE ENCOUNTER
Submitted PA for Wegovy  Via [facility] Key: [id number] STATUS: PENDING.    Follow up done daily; if no decision with in three days we will refax.  If another three days goes by with no decision will call the insurance for status.

## 2025-02-14 NOTE — TELEPHONE ENCOUNTER
Wegovy is excluded from plan benefits and Ozempic was denied because the patient is a type 1 diabetic. Do you want to appeal for the Ozempic?

## 2025-02-18 ENCOUNTER — TELEPHONE (OUTPATIENT)
Dept: ENDOCRINOLOGY | Age: 35
End: 2025-02-18

## 2025-02-18 NOTE — TELEPHONE ENCOUNTER
Patient informed of Wegovy ad Ozempic denials. Would like to start compounded semaglutide from [facility].

## 2025-02-18 NOTE — TELEPHONE ENCOUNTER
Please let the patient know her insurance is not covering GLP-1 agonist if she wants compounded GLP we can send the prescription for that

## 2025-03-20 DIAGNOSIS — E10.9 TYPE 1 DIABETES MELLITUS WITHOUT COMPLICATION: ICD-10-CM

## 2025-03-20 RX ORDER — PROCHLORPERAZINE 25 MG/1
SUPPOSITORY RECTAL
Qty: 1 EACH | Refills: 3 | Status: SHIPPED | OUTPATIENT
Start: 2025-03-20

## 2025-03-20 RX ORDER — PROCHLORPERAZINE 25 MG/1
SUPPOSITORY RECTAL
Qty: 1 EACH | Refills: 0 | Status: SHIPPED | OUTPATIENT
Start: 2025-03-20

## 2025-03-20 RX ORDER — PROCHLORPERAZINE 25 MG/1
SUPPOSITORY RECTAL
Qty: 3 EACH | Refills: 5 | Status: SHIPPED | OUTPATIENT
Start: 2025-03-20

## 2025-03-21 DIAGNOSIS — Z79.4 LONG-TERM INSULIN USE (HCC): ICD-10-CM

## 2025-03-21 DIAGNOSIS — E10.9 TYPE 1 DIABETES MELLITUS WITHOUT COMPLICATION: ICD-10-CM

## 2025-03-21 NOTE — TELEPHONE ENCOUNTER
Medication:   Requested Prescriptions     Pending Prescriptions Disp Refills    Insulin Disposable Pump (OMNIPOD DASH PODS, GEN 4,) MISC [Pharmacy Med Name: OMNIPOD DASH PACK 5'S (GEN 4)] 30 each 3     Sig: CHANGE EVERY 3 DAYS       Last Filled:      Patient Phone Number: [phone] (home)     Last appt: 12/11/2024   Next appt: 4/22/2025    Last Labs DM:   Lab Results   Component Value Date/Time    LABA1C 7.3 12/06/2024 07:20 AM     Last Lipid:   Lab Results   Component Value Date/Time    CHOL 195 12/06/2024 07:20 AM    TRIG 73 12/06/2024 07:20 AM    HDL 99 12/06/2024 07:20 AM     Last PSA: No results found for: \"PSA\"  Last Thyroid:   Lab Results   Component Value Date/Time    TSH 1.509 12/06/2024 07:20 AM    T4FREE 0.71 01/19/2021 07:48 AM

## 2025-03-24 RX ORDER — INSULIN PUMP CONTROLLER
EACH MISCELLANEOUS
Qty: 30 EACH | Refills: 0 | Status: SHIPPED | OUTPATIENT
Start: 2025-03-24

## 2025-03-26 ENCOUNTER — PATIENT MESSAGE (OUTPATIENT)
Dept: ENDOCRINOLOGY | Age: 35
End: 2025-03-26

## 2025-04-04 DIAGNOSIS — E10.9 TYPE 1 DIABETES MELLITUS WITHOUT COMPLICATION: Primary | ICD-10-CM

## 2025-04-04 RX ORDER — ACYCLOVIR 400 MG/1
TABLET ORAL
Qty: 3 EACH | Refills: 3 | Status: SHIPPED | OUTPATIENT
Start: 2025-04-04

## 2025-04-04 NOTE — TELEPHONE ENCOUNTER
Spoke to the patient and we are going to send G7 sensors to local pharmacy and patient is going to  a G7  and sensor from  today.

## 2025-04-10 ENCOUNTER — TELEPHONE (OUTPATIENT)
Dept: ENDOCRINOLOGY | Age: 35
End: 2025-04-10

## 2025-04-10 NOTE — TELEPHONE ENCOUNTER
Submitted PA for Dexcom Sensor  Via [employer] Key: [id number]   STATUS: Approved today by [employer] 2017  PA Case: [id number], Status: Approved, Coverage Starts on: 4/10/2025 12:00:00 AM, Coverage Ends on: 4/10/2026 12:00:00 AM.  Effective Date: 4/10/2025  Authorization Expiration Date: 4/10/2026    If this requires a response please respond to the pool ( P MHCX PSC MEDICATION PRE-AUTH).      Thank you please advise patient.

## 2025-04-11 LAB
A/G RATIO: 1.5 (ref 1–2)
ALBUMIN: 3.9 G/DL (ref 3.5–5.7)
ALP BLD-CCNC: 51 U/L (ref 34–104)
ALT SERPL-CCNC: 17 U/L (ref 7–52)
ANION GAP SERPL CALCULATED.3IONS-SCNC: 8 MMOL/L (ref 7–16)
AST SERPL-CCNC: 18 U/L (ref 13–39)
BILIRUB SERPL-MCNC: 0.5 MG/DL (ref 0.3–1)
BUN BLDV-MCNC: 10 MG/DL (ref 7–25)
CALCIUM SERPL-MCNC: 8.9 MG/DL (ref 8.6–10.2)
CHLORIDE BLD-SCNC: 105 MMOL/L (ref 98–107)
CHOLESTEROL, TOTAL: 146 MG/DL
CO2: 27 MMOL/L (ref 21–31)
CREAT SERPL-MCNC: 0.89 MG/DL (ref 0.6–1.2)
CREATININE, RANDOM URINE: 28.63 MG/DL
EGFR FEMALE: 87 ML/MIN/1.73M2
GLOBULIN: 2.6 G/DL (ref 2.6–4.2)
GLUCOSE BLD-MCNC: 148 MG/DL (ref 74–109)
HBA1C MFR BLD: 7 % (ref 4–6)
HDLC SERPL-MCNC: 68 MG/DL (ref 40–60)
LDL CHOLESTEROL: 67 MG/DL (ref 0–99)
MEAN PLASMA GLUCOSE: 154 MG/DL (ref 68–126)
MICROALBUMIN/CREAT 24H UR: <7 MG/L
MICROALBUMIN/CREAT UR-RTO: NORMAL
POTASSIUM SERPL-SCNC: 4 MMOL/L (ref 3.5–5.1)
SODIUM BLD-SCNC: 140 MMOL/L (ref 136–145)
TOTAL PROTEIN: 6.5 G/DL (ref 6–8.3)
TRIGL SERPL-MCNC: 54 MG/DL
TSH SERPL DL<=0.05 MIU/L-ACNC: 0.92 UIU/ML (ref 0.45–5.33)
VLDLC SERPL CALC-MCNC: 11 MG/DL (ref 0–40)

## 2025-04-22 ENCOUNTER — OFFICE VISIT (OUTPATIENT)
Dept: ENDOCRINOLOGY | Age: 35
End: 2025-04-22
Payer: COMMERCIAL

## 2025-04-22 VITALS
DIASTOLIC BLOOD PRESSURE: 87 MMHG | TEMPERATURE: 98 F | SYSTOLIC BLOOD PRESSURE: 140 MMHG | HEIGHT: 65 IN | HEART RATE: 86 BPM | BODY MASS INDEX: 29.99 KG/M2 | WEIGHT: 180 LBS

## 2025-04-22 DIAGNOSIS — E66.811 OBESITY (BMI 30.0-34.9): ICD-10-CM

## 2025-04-22 DIAGNOSIS — E06.3 HYPOTHYROIDISM DUE TO HASHIMOTO'S THYROIDITIS: ICD-10-CM

## 2025-04-22 DIAGNOSIS — E10.9 TYPE 1 DIABETES MELLITUS WITHOUT COMPLICATION (HCC): Primary | ICD-10-CM

## 2025-04-22 DIAGNOSIS — J45.20 MILD INTERMITTENT ASTHMA WITHOUT COMPLICATION: ICD-10-CM

## 2025-04-22 PROCEDURE — 95251 CONT GLUC MNTR ANALYSIS I&R: CPT | Performed by: INTERNAL MEDICINE

## 2025-04-22 PROCEDURE — 99214 OFFICE O/P EST MOD 30 MIN: CPT | Performed by: INTERNAL MEDICINE

## 2025-04-22 PROCEDURE — 3051F HG A1C>EQUAL 7.0%<8.0%: CPT | Performed by: INTERNAL MEDICINE

## 2025-04-22 RX ORDER — ACYCLOVIR 400 MG/1
TABLET ORAL
Qty: 3 EACH | Refills: 12 | Status: SHIPPED | OUTPATIENT
Start: 2025-04-22

## 2025-04-22 RX ORDER — LISINOPRIL 5 MG/1
5 TABLET ORAL DAILY
Qty: 30 TABLET | Refills: 1 | Status: SHIPPED | OUTPATIENT
Start: 2025-04-22 | End: 2025-04-23

## 2025-04-22 RX ORDER — PHENTERMINE AND TOPIRAMATE 11.25; 69 MG/1; MG/1
CAPSULE, EXTENDED RELEASE ORAL
Qty: 30 CAPSULE | Refills: 3 | Status: SHIPPED | OUTPATIENT
Start: 2025-04-22 | End: 2026-04-15

## 2025-04-22 RX ORDER — LISINOPRIL 5 MG/1
5 TABLET ORAL DAILY
Qty: 90 TABLET | Refills: 1 | Status: SHIPPED | OUTPATIENT
Start: 2025-04-22 | End: 2025-04-22

## 2025-04-22 NOTE — PATIENT INSTRUCTIONS
Patient Education        Lisinopril  (lyse in' oh pril)  Brand Name(s): Prinivil®, Qbrelis®, Zestril®, Zestoretic® (containing Hydrochlorothiazide, Lisinopril); also available generically  IMPORTANT WARNING:  Do not take lisinopril if you are pregnant. If you become pregnant while taking lisinopril, call your doctor immediately. Lisinopril may harm the fetus.  WHY is this medicine prescribed?  Lisinopril is used alone or in combination with other medications to treat high blood pressure in adults and children 6 years of age and older. It is used in combination with other medications to treat heart failure. Lisinopril is also used to improve survival after a heart attack. Lisinopril is in a class of medications called angiotensin-converting enzyme (ACE) inhibitors. It works by decreasing certain chemicals that tighten the blood vessels, so blood flows more smoothly and the heart can pump blood more efficiently.  High blood pressure is a common condition and when not treated, can cause damage to the brain, heart, blood vessels, kidneys, and other parts of the body. Damage to these organs may cause heart disease, a heart attack, heart failure, stroke, kidney failure, loss of vision, and other problems. In addition to taking medication, making lifestyle changes will also help to control your blood pressure. These changes include eating a diet that is low in fat and salt, maintaining a healthy weight, exercising at least 30 minutes most days, not smoking, and using alcohol in moderation.  Are there OTHER USES for this medicine?  This medication may be prescribed for other uses; ask your doctor or pharmacist for more information.  HOW should this medicine be used?  Lisinopril comes as a tablet and a solution (liquid) to take by mouth. It is usually taken once a day. To help you remember to take lisinopril, take it around the same time every day. Follow the directions on your prescription label carefully, and ask your

## 2025-04-22 NOTE — PROGRESS NOTES
Yvrose Nguyễn is a 34 y.o. female is seen for uncontrolled type 1 diabetes. Yvrose Nguyễn was diagnosed with Diabetes mellitus in 2015 .  Diabetes was diagnosed at OGTT when she was pregnant with her son  .  Yvrose Nguyễn got diabetic education in the past.  Pt is noted to C pep < 1.0 in 2017.  She has been on insulin for years  Comorbid conditions: Neuropathy  She stopped insulin after delivery but within 5 days she was admitted with DKA and sincethen she has been on insulin   She has Asthma and she is on inhalers.    INTERIM:    Patient is on the OmniPod insulin pump-, denies any unexplained hypoglycemia  Ozempic was not covered,  Stopped  Metformin in July 2021    Past Medical History:   Diagnosis Date    Asthma     Diabetes mellitus (HCC)     Eczema       Patient Active Problem List   Diagnosis    Type 1 diabetes, uncontrolled, with neuropathy    Mild intermittent asthma without complication    Long-term insulin use (HCC)    Hypothyroidism due to Hashimoto's thyroiditis     History reviewed. No pertinent surgical history.  Social History     Socioeconomic History    Marital status: Unknown     Spouse name: Not on file    Number of children: Not on file    Years of education: Not on file    Highest education level: Not on file   Occupational History    Not on file   Tobacco Use    Smoking status: Never    Smokeless tobacco: Never   Vaping Use    Vaping status: Never Used   Substance and Sexual Activity    Alcohol use: Yes     Comment: weekends    Drug use: Never    Sexual activity: Not on file   Other Topics Concern    Not on file   Social History Narrative    Not on file     Social Drivers of Health     Financial Resource Strain: Not on file   Food Insecurity: Unknown (1/21/2024)    Received from [facility], [facility]    Food Insecurities     Worried about running out of food: Not on file     Food Bought: Not on file   Transportation Needs:

## 2025-04-23 RX ORDER — LISINOPRIL 5 MG/1
5 TABLET ORAL DAILY
Qty: 90 TABLET | Refills: 1 | Status: SHIPPED | OUTPATIENT
Start: 2025-04-23

## 2025-07-09 RX ORDER — LISINOPRIL 5 MG/1
5 TABLET ORAL DAILY
Qty: 90 TABLET | Refills: 1 | Status: SHIPPED | OUTPATIENT
Start: 2025-07-09

## 2025-08-22 LAB
A/G RATIO: 1.5 (ref 1–2)
ALBUMIN: 4.3 G/DL (ref 3.5–5.7)
ALP BLD-CCNC: 50 U/L (ref 34–104)
ALT SERPL-CCNC: 14 U/L (ref 7–52)
ANION GAP SERPL CALCULATED.3IONS-SCNC: 10 MMOL/L (ref 7–16)
AST SERPL-CCNC: 16 U/L (ref 13–39)
BILIRUB SERPL-MCNC: 0.6 MG/DL (ref 0.3–1)
BUN BLDV-MCNC: 15 MG/DL (ref 7–25)
CALCIUM SERPL-MCNC: 8.7 MG/DL (ref 8.6–10.2)
CHLORIDE BLD-SCNC: 108 MMOL/L (ref 98–107)
CHOLESTEROL, TOTAL: 183 MG/DL
CO2: 19 MMOL/L (ref 21–31)
CREAT SERPL-MCNC: 0.99 MG/DL (ref 0.6–1.2)
CREATININE, RANDOM URINE: 146.45 MG/DL
EGFR FEMALE: 76 ML/MIN/1.73M2
GLOBULIN: 2.8 G/DL (ref 2.6–4.2)
GLUCOSE BLD-MCNC: 121 MG/DL (ref 74–109)
HBA1C MFR BLD: 7.7 % (ref 4–6)
HDLC SERPL-MCNC: 98 MG/DL (ref 40–60)
LDL CHOLESTEROL: 74 MG/DL (ref 0–99)
MEAN PLASMA GLUCOSE: 174 MG/DL (ref 68–126)
MICROALBUMIN/CREAT 24H UR: 7.5 MG/L
MICROALBUMIN/CREAT UR-RTO: 5.1 MG ALB/G CREAT
POTASSIUM SERPL-SCNC: 3.7 MMOL/L (ref 3.5–5.1)
SODIUM BLD-SCNC: 137 MMOL/L (ref 136–145)
TOTAL PROTEIN: 7.1 G/DL (ref 6–8.3)
TRIGL SERPL-MCNC: 54 MG/DL
TSH SERPL DL<=0.05 MIU/L-ACNC: 1.06 UIU/ML (ref 0.45–5.33)
VLDLC SERPL CALC-MCNC: 11 MG/DL (ref 0–40)